# Patient Record
Sex: MALE | Race: WHITE | NOT HISPANIC OR LATINO | Employment: FULL TIME | URBAN - METROPOLITAN AREA
[De-identification: names, ages, dates, MRNs, and addresses within clinical notes are randomized per-mention and may not be internally consistent; named-entity substitution may affect disease eponyms.]

---
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
## 2022-03-31 ENCOUNTER — HOSPITAL ENCOUNTER (EMERGENCY)
Facility: HOSPITAL | Age: 22
Discharge: HOME | End: 2022-04-01
Attending: EMERGENCY MEDICINE

## 2022-03-31 DIAGNOSIS — I10 HYPERTENSION, UNSPECIFIED TYPE: Primary | ICD-10-CM

## 2022-03-31 LAB
ANION GAP SERPL CALC-SCNC: 11 MMOL/L (ref 8–16)
BUN SERPL-MCNC: 17 MG/DL (ref 6–20)
CALCIUM SERPL-MCNC: 9.5 MG/DL (ref 8.7–10.5)
CHLORIDE SERPL-SCNC: 102 MMOL/L (ref 95–110)
CO2 SERPL-SCNC: 26 MMOL/L (ref 23–29)
CREAT SERPL-MCNC: 1.1 MG/DL (ref 0.5–1.4)
EST. GFR  (AFRICAN AMERICAN): >60 ML/MIN/1.73 M^2
EST. GFR  (NON AFRICAN AMERICAN): >60 ML/MIN/1.73 M^2
GLUCOSE SERPL-MCNC: 90 MG/DL (ref 70–110)
POTASSIUM SERPL-SCNC: 3.9 MMOL/L (ref 3.5–5.1)
SODIUM SERPL-SCNC: 139 MMOL/L (ref 136–145)

## 2022-03-31 PROCEDURE — 99282 EMERGENCY DEPT VISIT SF MDM: CPT

## 2022-03-31 PROCEDURE — 36415 COLL VENOUS BLD VENIPUNCTURE: CPT | Performed by: PHYSICIAN ASSISTANT

## 2022-03-31 PROCEDURE — 80048 BASIC METABOLIC PNL TOTAL CA: CPT | Performed by: PHYSICIAN ASSISTANT

## 2022-03-31 NOTE — Clinical Note
"Seth Blair (Hunter) was seen and treated in our emergency department on 3/31/2022.  He may return to work on 04/04/2022.       If you have any questions or concerns, please don't hesitate to call.       RN    "

## 2022-04-01 VITALS
HEART RATE: 71 BPM | HEIGHT: 71 IN | WEIGHT: 253.5 LBS | BODY MASS INDEX: 35.49 KG/M2 | SYSTOLIC BLOOD PRESSURE: 126 MMHG | OXYGEN SATURATION: 97 % | TEMPERATURE: 98 F | DIASTOLIC BLOOD PRESSURE: 66 MMHG | RESPIRATION RATE: 18 BRPM

## 2022-04-01 NOTE — DISCHARGE INSTRUCTIONS
[facility] [phone]    - Dr. Ramirez  - Dr. Choudhury  - Dr. Mcknight  - Dr. Hernandez    [facility]    - Dr. Chung [phone]  - Dr. Gonzalez [phone]  - Dr. Benitez [phone]    Dr. Martinez [phone]  Dr. Yates [phone]  Dr. Valencia [phone]  Dr. Chung [phone]  Dr. Garcia [phone]  Dr. Gutierrez [phone]  Dr. Alcantar [phone]  Dr. Simmons [phone]  Dr. Condon [phone]    [facility]    [facility] [phone]  Dr. Palumbo [phone]  Dr. Peace [phone]  Dr. Reyes [phone]  Dr. Watson [phone]  Dr. Faustin [phone]  Dr. De La Rosa [phone]          [facility]  - [address] [place] [address]  - [phone]    [facility]  - [address] [place] [address]  - [phone]    [facility]  - [address], [address], [place]  - [phone]    [facility]  - [address] [place] [address]  - [phone]    [facility]  - [address], [place] [address]  - [phone]    [facility]  - [address] [place] [address]  - [phone]    [facility]  - [address] [place], [place] [id number] - [phone]     pain control  Ortho follow up  MRI showed tendinitis  May benefit from po steroids  PT/Rehab

## 2022-04-01 NOTE — ED PROVIDER NOTES
Encounter Date: 3/31/2022       History     Chief Complaint   Patient presents with    Hypertension     Pt c/o high blood pressure that he was dx with since Tuesday , pt is having a hard time finding a PCP , pt came in with mother today after pt reported not feeling great after checking his BP at a friends house ; resp e/u ; GCS 15 ; NADN ; pt has 1 kidney     21 yo pmhx of solitary kidney male pw hypertension. Denies CP, slurred speech, facial asymmetry, vision changes, unilateral weakness or numbness. No difficulty walking. Pt has appointment scheduled with PCP next month but was not sure if he could wait that long. Denies fevers, chills, cough, SOB, N/V/D, abdominal pain, dysuria, hematuria or any other complaints.      The history is provided by the patient.     Review of patient's allergies indicates:  No Known Allergies  No past medical history on file.  No past surgical history on file.  No family history on file.     Review of Systems   Constitutional: Negative for activity change, diaphoresis and fever.   HENT: Negative for drooling, rhinorrhea, sore throat and trouble swallowing.    Eyes: Negative for pain and visual disturbance.   Respiratory: Negative for cough, shortness of breath and stridor.    Cardiovascular: Negative for chest pain and leg swelling.   Gastrointestinal: Negative for abdominal distention, abdominal pain, constipation and vomiting.   Genitourinary: Negative for dysuria, hematuria and penile discharge.   Musculoskeletal: Negative for gait problem.   Skin: Negative for rash.   Neurological: Negative for seizures, facial asymmetry and headaches.   Psychiatric/Behavioral: Negative for hallucinations and suicidal ideas.       Physical Exam     Initial Vitals [03/31/22 2019]   BP Pulse Resp Temp SpO2   (!) 157/104 92 20 98.3 °F (36.8 °C) 98 %      MAP       --         Physical Exam    Nursing note and vitals reviewed.  Constitutional: He appears well-developed. No distress.   HENT:   Head:  Normocephalic and atraumatic.   Nose: Nose normal.   Eyes: EOM are normal.   Neck: Neck supple. No tracheal deviation present. No JVD present.   Cardiovascular: Normal rate, regular rhythm, normal heart sounds and intact distal pulses. Exam reveals no gallop and no friction rub.    No murmur heard.  Pulmonary/Chest: Breath sounds normal. No respiratory distress. He has no wheezes. He has no rhonchi. He has no rales.   Abdominal: Abdomen is soft. Bowel sounds are normal. He exhibits no distension. There is no abdominal tenderness. There is no rebound and no guarding.   Musculoskeletal:         General: Normal range of motion.      Cervical back: Neck supple.     Neurological: He is alert and oriented to person, place, and time. He has normal strength. No cranial nerve deficit or sensory deficit.   Cranial nerves II through XII grossly intact. Finger to nose normal. Tone normal. Sens intact to light touch. No drift. No disdiadochokinesia. Strength 5/5 bilaterally upper and lower. Normal gait. No Romberg. Speech and cognition is normal.  No focal neurologic deficit.     Skin: Skin is warm and dry. Capillary refill takes less than 2 seconds. No rash noted.   Psychiatric: He has a normal mood and affect.         ED Course   Procedures  Labs Reviewed   BASIC METABOLIC PANEL          Imaging Results    None          Medications - No data to display  Medical Decision Making:   ED Management:  21 yo M presents to the emergency department complaining of high blood pressure.  Patient is otherwise asymptomatic without confusion, chest pain, hematuria, or SOB.  BP today is 157/104  Doubt CV, AMI, heart failure, renal infarction or failure or other end organ damage.    Disposition:Discussed with patient their elevated blood pressure and need for close outpatient management of their hypertension. Pt understands and agrees with discharge instructions. Pt also given strict return precautions for any new or worsening symptoms and  plans to follow up closely with PCP.                         Clinical Impression:   Final diagnoses:  [I10] Hypertension, unspecified type (Primary)          ED Disposition Condition    Discharge Stable        ED Prescriptions     None        Follow-up Information     Follow up With Specialties Details Why Contact [facility]  Go in 1 day  [address] [facility] [address]  [phone]      [facility] Emergency Dept Emergency Medicine Go to  As needed, If symptoms worsen [address] [place] [address] Drive  [facility] [address] [phone]    Ella Hernandez MD Family Medicine Go in 1 day  [address] [facility] [address]  [phone]             Rito Stokes MD  04/01/22 5354

## 2022-04-01 NOTE — FIRST PROVIDER EVALUATION
Emergency Department TeleTriage Encounter Note      CHIEF COMPLAINT    Chief Complaint   Patient presents with    Hypertension     Pt c/o high blood pressure that he was dx with since Tuesday , pt is having a hard time finding a PCP , pt came in with mother today after pt reported not feeling great after checking his BP at a friends house ; resp e/u ; GCS 15 ; NADN ; pt has 1 kidney       VITAL SIGNS   Initial Vitals [03/31/22 2019]   BP Pulse Resp Temp SpO2   (!) 157/104 92 20 98.3 °F (36.8 °C) 98 %      MAP       --            ALLERGIES    Review of patient's allergies indicates:  No Known Allergies    PROVIDER TRIAGE NOTE  This is a teletriage evaluation of a 22 y.o. male presenting to the ED with c/o feeling dizzy/overheated while working outside today, check his BP as it has been running high recently when checked, 140/90 at home. Concern for BP reading given single kidney. Reports intermittent dizziness when he turns his head or stands up quickly. Not on medication for HTN.     PE: no truncal ataxia, no dysarthria. Non-toxic/well-appearing. No respiratory distress, speaks in full sentences without issue. No active emesis nor cough. Normal eye contact and mentation.     Plan: BMP for Cr eval. Do not suspect central dizziness in etiology at this time. Further/augmented workup at discretion of examining provider.     All ED beds are full at present; patient notified of this status.  Patient seen and medically screened by BERNICE via teletriage. Orders initiated at triage to expedite care.  Patient is stable and will be placed in an ED bed when available.  Care will be transferred to an alternate provider when patient has been placed in an Exam Room further exam, additional orders, and disposition.         ORDERS  Labs Reviewed   BASIC METABOLIC PANEL       ED Orders (720h ago, onward)    Start Ordered     Status Ordering Provider    03/31/22 2122 03/31/22 2122  Basic metabolic panel  STAT         Pending Collection  LEONIDES BRANTLEY            Virtual Visit Note: The provider triage portion of this emergency department evaluation and documentation was performed via [employer], a HIPAA-compliant telemedicine application, in concert with a tele-presenter in the room. A face to face patient evaluation with one of my colleagues will occur once the patient is placed in an emergency department room.      DISCLAIMER: This note was prepared with [employer] voice recognition transcription software. Garbled syntax, mangled pronouns, and other bizarre constructions may be attributed to that software system.

## 2023-06-02 PROCEDURE — 10120 INC&RMVL FB SUBQ TISS SMPL: CPT

## 2023-06-02 PROCEDURE — 99284 EMERGENCY DEPT VISIT MOD MDM: CPT | Mod: 25

## 2023-06-03 ENCOUNTER — HOSPITAL ENCOUNTER (EMERGENCY)
Facility: HOSPITAL | Age: 23
Discharge: HOME | End: 2023-06-03
Attending: FAMILY MEDICINE

## 2023-06-03 VITALS
HEART RATE: 89 BPM | HEIGHT: 71 IN | BODY MASS INDEX: 35.28 KG/M2 | WEIGHT: 252 LBS | TEMPERATURE: 99 F | OXYGEN SATURATION: 100 % | DIASTOLIC BLOOD PRESSURE: 81 MMHG | RESPIRATION RATE: 18 BRPM | SYSTOLIC BLOOD PRESSURE: 144 MMHG

## 2023-06-03 DIAGNOSIS — M79.5 FOREIGN BODY (FB) IN SOFT TISSUE: ICD-10-CM

## 2023-06-03 PROCEDURE — 25000003 PHARM REV CODE 250

## 2023-06-03 PROCEDURE — 96375 TX/PRO/DX INJ NEW DRUG ADDON: CPT

## 2023-06-03 PROCEDURE — 90715 TDAP VACCINE 7 YRS/> IM: CPT | Performed by: FAMILY MEDICINE

## 2023-06-03 PROCEDURE — 25000003 PHARM REV CODE 250: Performed by: FAMILY MEDICINE

## 2023-06-03 PROCEDURE — 63600175 PHARM REV CODE 636 W HCPCS: Performed by: FAMILY MEDICINE

## 2023-06-03 PROCEDURE — 96365 THER/PROPH/DIAG IV INF INIT: CPT

## 2023-06-03 PROCEDURE — 90471 IMMUNIZATION ADMIN: CPT | Performed by: FAMILY MEDICINE

## 2023-06-03 RX ORDER — CLINDAMYCIN HYDROCHLORIDE 150 MG/1
450 CAPSULE ORAL EVERY 8 HOURS
Qty: 90 CAPSULE | Refills: 0 | Status: SHIPPED | OUTPATIENT
Start: 2023-06-03 | End: 2023-06-13

## 2023-06-03 RX ORDER — LEVOFLOXACIN 250 MG/1
750 TABLET ORAL
Status: COMPLETED | OUTPATIENT
Start: 2023-06-03 | End: 2023-06-03

## 2023-06-03 RX ORDER — LEVOFLOXACIN 750 MG/1
750 TABLET ORAL DAILY
Qty: 10 TABLET | Refills: 0 | Status: SHIPPED | OUTPATIENT
Start: 2023-06-03 | End: 2023-06-13

## 2023-06-03 RX ORDER — HYDROCODONE BITARTRATE AND ACETAMINOPHEN 7.5; 325 MG/1; MG/1
1 TABLET ORAL EVERY 6 HOURS PRN
Qty: 16 TABLET | Refills: 0 | Status: SHIPPED | OUTPATIENT
Start: 2023-06-03

## 2023-06-03 RX ORDER — LIDOCAINE HYDROCHLORIDE 20 MG/ML
10 INJECTION, SOLUTION EPIDURAL; INFILTRATION; INTRACAUDAL; PERINEURAL
Status: COMPLETED | OUTPATIENT
Start: 2023-06-03 | End: 2023-06-03

## 2023-06-03 RX ORDER — HYDROMORPHONE HYDROCHLORIDE 1 MG/ML
1 INJECTION, SOLUTION INTRAMUSCULAR; INTRAVENOUS; SUBCUTANEOUS
Status: COMPLETED | OUTPATIENT
Start: 2023-06-03 | End: 2023-06-03

## 2023-06-03 RX ORDER — HYDROCODONE BITARTRATE AND ACETAMINOPHEN 10; 325 MG/1; MG/1
1 TABLET ORAL
Status: COMPLETED | OUTPATIENT
Start: 2023-06-03 | End: 2023-06-03

## 2023-06-03 RX ORDER — DOXYCYCLINE 100 MG/1
100 CAPSULE ORAL 2 TIMES DAILY
Qty: 20 CAPSULE | Refills: 0 | Status: SHIPPED | OUTPATIENT
Start: 2023-06-03 | End: 2023-06-13

## 2023-06-03 RX ORDER — CLINDAMYCIN PHOSPHATE 150 MG/ML
INJECTION, SOLUTION INTRAVENOUS
Status: COMPLETED
Start: 2023-06-03 | End: 2023-06-03

## 2023-06-03 RX ORDER — LIDOCAINE HYDROCHLORIDE AND EPINEPHRINE 20; 10 MG/ML; UG/ML
10 INJECTION, SOLUTION INFILTRATION; PERINEURAL ONCE
Status: DISCONTINUED | OUTPATIENT
Start: 2023-06-03 | End: 2023-06-03

## 2023-06-03 RX ORDER — CLINDAMYCIN PHOSPHATE 600 MG/50ML
600 INJECTION, SOLUTION INTRAVENOUS
Status: DISCONTINUED | OUTPATIENT
Start: 2023-06-03 | End: 2023-06-03

## 2023-06-03 RX ADMIN — LEVOFLOXACIN 750 MG: 250 TABLET, FILM COATED ORAL at 02:06

## 2023-06-03 RX ADMIN — HYDROMORPHONE HYDROCHLORIDE 1 MG: 1 INJECTION, SOLUTION INTRAMUSCULAR; INTRAVENOUS; SUBCUTANEOUS at 02:06

## 2023-06-03 RX ADMIN — HYDROCODONE BITARTRATE AND ACETAMINOPHEN 1 TABLET: 10; 325 TABLET ORAL at 06:06

## 2023-06-03 RX ADMIN — LIDOCAINE HYDROCHLORIDE 200 MG: 20 INJECTION, SOLUTION EPIDURAL; INFILTRATION; INTRACAUDAL; PERINEURAL at 03:06

## 2023-06-03 RX ADMIN — TETANUS TOXOID, REDUCED DIPHTHERIA TOXOID AND ACELLULAR PERTUSSIS VACCINE, ADSORBED 0.5 ML: 5; 2.5; 8; 8; 2.5 SUSPENSION INTRAMUSCULAR at 02:06

## 2023-06-03 RX ADMIN — CLINDAMYCIN PHOSPHATE 600 MG: 150 INJECTION, SOLUTION INTRAVENOUS at 04:06

## 2023-06-03 RX ADMIN — DOXYCYCLINE 100 MG: 100 INJECTION, POWDER, LYOPHILIZED, FOR SOLUTION INTRAVENOUS at 02:06

## 2023-06-03 NOTE — ED PROVIDER NOTES
Encounter Date: 6/2/2023       History     Chief Complaint   Patient presents with    Foreign Body in Skin     Catfish franco to L forearm       Patient comes our facility after having a catfish franco stuck into his left forearm.  Patient states this happened around 11:30 p.m. on 06/02/2023.  Patient attempted to remove the acosta was unable to do so.  Patient has some mild swelling and pain at site.    Review of patient's allergies indicates:  No Known Allergies  Past Medical History:   Diagnosis Date    Hypertension      No past surgical history on file.  No family history on file.  Social History     Tobacco Use    Smoking status: Every Day     Types: Cigarettes, Vaping with nicotine   Substance Use Topics    Alcohol use: Yes    Drug use: Never     Review of Systems   Skin:  Positive for wound.   All other systems reviewed and are negative.    Physical Exam     Initial Vitals   BP Pulse Resp Temp SpO2   06/02/23 2319 06/02/23 2318 06/02/23 2318 06/02/23 2318 06/02/23 2318   (!) 144/81 89 18 98.5 °F (36.9 °C) 100 %      MAP       --                Physical Exam    Nursing note and vitals reviewed.  Constitutional: He appears well-developed and well-nourished. He is not diaphoretic. No distress.   HENT:   Head: Normocephalic and atraumatic.   Nose: Nose normal.   Eyes: Conjunctivae and EOM are normal. Right eye exhibits no discharge. Left eye exhibits no discharge. No scleral icterus.   Neck: Neck supple.   Normal range of motion.  Cardiovascular:  Normal rate, regular rhythm, normal heart sounds and intact distal pulses.           No murmur heard.  Pulmonary/Chest: Breath sounds normal. No respiratory distress. He has no wheezes.   Abdominal: Abdomen is soft. Bowel sounds are normal. He exhibits no distension. There is no abdominal tenderness.   Musculoskeletal:         General: Tenderness present. No edema. Normal range of motion.      Cervical back: Normal range of motion and neck supple.      Comments: Tenderness  "to left forearm surrounding calf is franco site     Lymphadenopathy:     He has no cervical adenopathy.   Neurological: He is alert and oriented to person, place, and time. He has normal strength. No sensory deficit. GCS score is 15. GCS eye subscore is 4. GCS verbal subscore is 5. GCS motor subscore is 6.   Skin: Skin is warm. Capillary refill takes less than 2 seconds. No rash and no abscess noted. No pallor.   Mild tissue swelling around calf patient franco puncture wound   Psychiatric: He has a normal mood and affect. His behavior is normal. Judgment and thought content normal.       ED Course   Foreign Body    Date/Time: 6/3/2023 4:27 AM  Performed by: Lazaro Fink MD  Authorized by: Lazaro Fink MD   Consent Done: Yes  Consent: Verbal consent obtained.  Risks and benefits: risks, benefits and alternatives were discussed  Consent given by: parent  Patient understanding: patient states understanding of the procedure being performed  Patient consent: the patient's understanding of the procedure matches consent given  Procedure consent: procedure consent matches procedure scheduled  Relevant documents: relevant documents present and verified  Test results: test results available and properly labeled  Site marked: the operative site was marked  Imaging studies: imaging studies available  Required items: required blood products, implants, devices, and special equipment available  Patient identity confirmed: MRN, , name and verbally with patient  Time out: Immediately prior to procedure a "time out" was called to verify the correct patient, procedure, equipment, support staff and site/side marked as required.  Body area: skin  General location: upper extremity  Location details: left forearm  Anesthesia: local infiltration    Anesthesia:  Local Anesthetic: lidocaine 2% with epinephrine  Anesthetic total: 8.5 mL    Patient sedated: no  Patient restrained: no  Patient cooperative: yes  Localization method: " visualized (X-ray used as well)  Removal mechanism: forceps and scalpel  Dressing: dressing applied  Tendon involvement: none  Depth: subcutaneous  Complexity: simple  1 objects recovered.  Objects recovered: Catfish sofia in 1 piece  Post-procedure assessment: foreign body removed  Patient tolerance: Patient tolerated the procedure well with no immediate complications    Labs Reviewed - No data to display       Imaging Results              X-Ray Forearm Left (In process)                   X-Rays:   Independently Interpreted Readings:   Other Readings:  X-ray of left forearm shows foreign body, catfish sofia with about 1.7 cm penetration in the skin tissue. sofia is in 1 piece.  Medications   LIDOcaine 2%/EPINEPHrine 1:100,000 injection 10 mL (has no administration in time range)   clindamycin in D5W 600 mg/50 mL IVPB 600 mg (has no administration in time range)   Tdap (BOOSTRIX) vaccine injection 0.5 mL (0.5 mLs Intramuscular Given 6/3/23 0250)   doxycycline (VIBRAMYCIN) 100 mg in dextrose 5 % (D5W) 250 mL IVPB (Vial-Mate) (100 mg Intravenous New Bag 6/3/23 0255)   levoFLOXacin tablet 750 mg (750 mg Oral Given 6/3/23 0249)   HYDROmorphone injection 1 mg (1 mg Intravenous Given 6/3/23 0252)     Medical Decision Making:   Initial Assessment:   Catfish sofia, concern infectious source  ED Management:  Patient arrived with catfish sofia to left forearm.  Calf is was in salt water.  Patient received antibiotics to include clindamycin, Levaquin, doxycycline in the emergency department.  Calf is volar was removed.  X-ray was used to identify sofia.  Sofia was removed in 1 piece.  Patient did require slight opening of penetrating wound to remove bar.  Wound was left open without sutures.  Wound was dressed.  Patient was prescribed oral antibiotics to include clindamycin, doxycycline, Levaquin.  Patient to follow up with PCP in the next 2 weeks.  Patient was given thorough instructions as to when to return to ER when signs of  infection occurred.  Patient and mother voiced understanding.                        Clinical Impression:   Final diagnoses:  [M79.5] Foreign body (FB) in soft tissue - Catfish franco        ED Disposition Condition    Discharge Stable          ED Prescriptions       Medication Sig Dispense Start Date End Date Auth. Provider    clindamycin (CLEOCIN) 150 MG capsule Take 3 capsules (450 mg total) by mouth every 8 (eight) hours. for 10 days 90 capsule 6/3/2023 6/13/2023 Lazaro Fink MD    levoFLOXacin (LEVAQUIN) 750 MG tablet Take 1 tablet (750 mg total) by mouth once daily. for 10 days 10 tablet 6/3/2023 6/13/2023 Lazaro Fink MD    doxycycline (VIBRAMYCIN) 100 MG Cap Take 1 capsule (100 mg total) by mouth 2 (two) times daily. for 10 days 20 capsule 6/3/2023 6/13/2023 Lazaro Fink MD    HYDROcodone-acetaminophen (NORCO) 7.5-325 mg per tablet Take 1 tablet by mouth every 6 (six) hours as needed for Pain. 16 tablet 6/3/2023 -- Lazaro Fink MD          Follow-up Information    None     Follow-up with primary care physician in 2 weeks.  Return ER signs of infection occur.     Lazaro Fink MD  06/03/23 043

## 2023-06-03 NOTE — DISCHARGE INSTRUCTIONS
Follow-up with primary care physician the next 2 weeks.  Return to ER signs of infection as discussed occur.  Take antibiotics as prescribed.

## 2023-08-06 ENCOUNTER — HOSPITAL ENCOUNTER (OUTPATIENT)
Facility: HOSPITAL | Age: 23
Discharge: HOME | End: 2023-08-07
Attending: EMERGENCY MEDICINE | Admitting: INTERNAL MEDICINE

## 2023-08-06 DIAGNOSIS — R20.2 PARESTHESIAS: ICD-10-CM

## 2023-08-06 DIAGNOSIS — G45.9 TIA (TRANSIENT ISCHEMIC ATTACK): ICD-10-CM

## 2023-08-06 DIAGNOSIS — I63.9 STROKE: Primary | ICD-10-CM

## 2023-08-06 DIAGNOSIS — R20.0 LEFT SIDED NUMBNESS: ICD-10-CM

## 2023-08-06 PROBLEM — E66.9 OBESITY: Status: ACTIVE | Noted: 2023-08-06

## 2023-08-06 PROBLEM — I10 HTN (HYPERTENSION): Status: ACTIVE | Noted: 2023-08-06

## 2023-08-06 PROBLEM — E78.5 HLD (HYPERLIPIDEMIA): Status: ACTIVE | Noted: 2023-08-06

## 2023-08-06 LAB
ALBUMIN SERPL BCP-MCNC: 4.3 G/DL (ref 3.5–5.2)
ALP SERPL-CCNC: 89 U/L (ref 55–135)
ALT SERPL W/O P-5'-P-CCNC: 29 U/L (ref 10–44)
AMPHET+METHAMPHET UR QL: NEGATIVE
AMPHET+METHAMPHET UR QL: NEGATIVE
ANION GAP SERPL CALC-SCNC: 5 MMOL/L (ref 8–16)
AST SERPL-CCNC: 27 U/L (ref 10–40)
BARBITURATES UR QL SCN>200 NG/ML: NEGATIVE
BARBITURATES UR QL SCN>200 NG/ML: NEGATIVE
BASOPHILS # BLD AUTO: 0.05 K/UL (ref 0–0.2)
BASOPHILS NFR BLD: 0.4 % (ref 0–1.9)
BENZODIAZ UR QL SCN>200 NG/ML: NEGATIVE
BENZODIAZ UR QL SCN>200 NG/ML: NEGATIVE
BILIRUB SERPL-MCNC: 0.6 MG/DL (ref 0.1–1)
BILIRUB UR QL STRIP: NEGATIVE
BUN SERPL-MCNC: 12 MG/DL (ref 6–20)
BZE UR QL SCN: NEGATIVE
BZE UR QL SCN: NEGATIVE
CALCIUM SERPL-MCNC: 9 MG/DL (ref 8.7–10.5)
CANNABINOIDS UR QL SCN: NEGATIVE
CANNABINOIDS UR QL SCN: NEGATIVE
CHLORIDE SERPL-SCNC: 108 MMOL/L (ref 95–110)
CHOLEST SERPL-MCNC: 210 MG/DL (ref 120–199)
CHOLEST/HDLC SERPL: 6.8 {RATIO} (ref 2–5)
CLARITY UR: CLEAR
CO2 SERPL-SCNC: 26 MMOL/L (ref 23–29)
COLOR UR: YELLOW
CREAT SERPL-MCNC: 1.3 MG/DL (ref 0.5–1.4)
CREAT SERPL-MCNC: 1.4 MG/DL (ref 0.5–1.4)
CREAT UR-MCNC: 68 MG/DL (ref 23–375)
CREAT UR-MCNC: 78 MG/DL (ref 23–375)
DIFFERENTIAL METHOD: ABNORMAL
EOSINOPHIL # BLD AUTO: 0.4 K/UL (ref 0–0.5)
EOSINOPHIL NFR BLD: 3.1 % (ref 0–8)
ERYTHROCYTE [DISTWIDTH] IN BLOOD BY AUTOMATED COUNT: 13.1 % (ref 11.5–14.5)
EST. GFR  (NO RACE VARIABLE): >60 ML/MIN/1.73 M^2
ETHANOL SERPL-MCNC: <5 MG/DL
GLUCOSE SERPL-MCNC: 87 MG/DL (ref 70–110)
GLUCOSE SERPL-MCNC: 93 MG/DL (ref 70–110)
GLUCOSE UR QL STRIP: NEGATIVE
HCT VFR BLD AUTO: 42.9 % (ref 40–54)
HDLC SERPL-MCNC: 31 MG/DL (ref 40–75)
HDLC SERPL: 14.8 % (ref 20–50)
HGB BLD-MCNC: 14.8 G/DL (ref 14–18)
HGB UR QL STRIP: NEGATIVE
IMM GRANULOCYTES # BLD AUTO: 0.04 K/UL (ref 0–0.04)
IMM GRANULOCYTES NFR BLD AUTO: 0.4 % (ref 0–0.5)
INR PPP: 0.9 (ref 0.8–1.2)
KETONES UR QL STRIP: NEGATIVE
LDLC SERPL CALC-MCNC: 111.4 MG/DL (ref 63–159)
LEUKOCYTE ESTERASE UR QL STRIP: NEGATIVE
LYMPHOCYTES # BLD AUTO: 2 K/UL (ref 1–4.8)
LYMPHOCYTES NFR BLD: 18.1 % (ref 18–48)
MAGNESIUM SERPL-MCNC: 1.9 MG/DL (ref 1.6–2.6)
MCH RBC QN AUTO: 31.3 PG (ref 27–31)
MCHC RBC AUTO-ENTMCNC: 34.5 G/DL (ref 32–36)
MCV RBC AUTO: 91 FL (ref 82–98)
MONOCYTES # BLD AUTO: 1.1 K/UL (ref 0.3–1)
MONOCYTES NFR BLD: 10.2 % (ref 4–15)
NEUTROPHILS # BLD AUTO: 7.6 K/UL (ref 1.8–7.7)
NEUTROPHILS NFR BLD: 67.8 % (ref 38–73)
NITRITE UR QL STRIP: NEGATIVE
NONHDLC SERPL-MCNC: 179 MG/DL
NRBC BLD-RTO: 0 /100 WBC
OPIATES UR QL SCN: NEGATIVE
OPIATES UR QL SCN: NEGATIVE
PCP UR QL SCN>25 NG/ML: NEGATIVE
PCP UR QL SCN>25 NG/ML: NEGATIVE
PH UR STRIP: 6 [PH] (ref 5–8)
PLATELET # BLD AUTO: 253 K/UL (ref 150–450)
PMV BLD AUTO: 10.2 FL (ref 9.2–12.9)
POTASSIUM SERPL-SCNC: 3.8 MMOL/L (ref 3.5–5.1)
PROT SERPL-MCNC: 7.7 G/DL (ref 6–8.4)
PROT UR QL STRIP: ABNORMAL
PROTHROMBIN TIME: 10.4 SEC (ref 9–12.5)
RBC # BLD AUTO: 4.73 M/UL (ref 4.6–6.2)
SAMPLE: NORMAL
SODIUM SERPL-SCNC: 139 MMOL/L (ref 136–145)
SP GR UR STRIP: >1.03 (ref 1–1.03)
TOXICOLOGY INFORMATION: NORMAL
TOXICOLOGY INFORMATION: NORMAL
TRIGL SERPL-MCNC: 338 MG/DL (ref 30–150)
TSH SERPL DL<=0.005 MIU/L-ACNC: 0.86 UIU/ML (ref 0.34–5.6)
URN SPEC COLLECT METH UR: ABNORMAL
UROBILINOGEN UR STRIP-ACNC: NEGATIVE EU/DL
WBC # BLD AUTO: 11.19 K/UL (ref 3.9–12.7)

## 2023-08-06 PROCEDURE — 93010 EKG 12-LEAD: ICD-10-PCS | Mod: ,,, | Performed by: INTERNAL MEDICINE

## 2023-08-06 PROCEDURE — G0378 HOSPITAL OBSERVATION PER HR: HCPCS

## 2023-08-06 PROCEDURE — 83735 ASSAY OF MAGNESIUM: CPT | Performed by: EMERGENCY MEDICINE

## 2023-08-06 PROCEDURE — 99900035 HC TECH TIME PER 15 MIN (STAT)

## 2023-08-06 PROCEDURE — 80307 DRUG TEST PRSMV CHEM ANLYZR: CPT | Performed by: EMERGENCY MEDICINE

## 2023-08-06 PROCEDURE — 85610 PROTHROMBIN TIME: CPT | Performed by: EMERGENCY MEDICINE

## 2023-08-06 PROCEDURE — 93005 ELECTROCARDIOGRAM TRACING: CPT | Performed by: INTERNAL MEDICINE

## 2023-08-06 PROCEDURE — 36415 COLL VENOUS BLD VENIPUNCTURE: CPT | Performed by: EMERGENCY MEDICINE

## 2023-08-06 PROCEDURE — 82962 GLUCOSE BLOOD TEST: CPT

## 2023-08-06 PROCEDURE — 84443 ASSAY THYROID STIM HORMONE: CPT | Performed by: EMERGENCY MEDICINE

## 2023-08-06 PROCEDURE — 93010 ELECTROCARDIOGRAM REPORT: CPT | Mod: ,,, | Performed by: INTERNAL MEDICINE

## 2023-08-06 PROCEDURE — 25500020 PHARM REV CODE 255: Performed by: EMERGENCY MEDICINE

## 2023-08-06 PROCEDURE — 80307 DRUG TEST PRSMV CHEM ANLYZR: CPT | Performed by: INTERNAL MEDICINE

## 2023-08-06 PROCEDURE — 94760 N-INVAS EAR/PLS OXIMETRY 1: CPT

## 2023-08-06 PROCEDURE — 80061 LIPID PANEL: CPT | Performed by: EMERGENCY MEDICINE

## 2023-08-06 PROCEDURE — 82565 ASSAY OF CREATININE: CPT

## 2023-08-06 PROCEDURE — G0425 INPT/ED TELECONSULT30: HCPCS | Mod: GT,,, | Performed by: PSYCHIATRY & NEUROLOGY

## 2023-08-06 PROCEDURE — 99291 CRITICAL CARE FIRST HOUR: CPT

## 2023-08-06 PROCEDURE — 85025 COMPLETE CBC W/AUTO DIFF WBC: CPT | Performed by: EMERGENCY MEDICINE

## 2023-08-06 PROCEDURE — 82077 ASSAY SPEC XCP UR&BREATH IA: CPT | Performed by: EMERGENCY MEDICINE

## 2023-08-06 PROCEDURE — 81003 URINALYSIS AUTO W/O SCOPE: CPT | Mod: 59 | Performed by: INTERNAL MEDICINE

## 2023-08-06 PROCEDURE — 80053 COMPREHEN METABOLIC PANEL: CPT | Performed by: EMERGENCY MEDICINE

## 2023-08-06 PROCEDURE — 25000003 PHARM REV CODE 250: Performed by: EMERGENCY MEDICINE

## 2023-08-06 PROCEDURE — G0425 PR INPT TELEHEALTH CONSULT 30M: ICD-10-PCS | Mod: GT,,, | Performed by: PSYCHIATRY & NEUROLOGY

## 2023-08-06 RX ORDER — ATORVASTATIN CALCIUM 40 MG/1
40 TABLET, FILM COATED ORAL DAILY
Status: DISCONTINUED | OUTPATIENT
Start: 2023-08-07 | End: 2023-08-07 | Stop reason: HOSPADM

## 2023-08-06 RX ORDER — SODIUM CHLORIDE 0.9 % (FLUSH) 0.9 %
10 SYRINGE (ML) INJECTION
Status: DISCONTINUED | OUTPATIENT
Start: 2023-08-06 | End: 2023-08-07 | Stop reason: HOSPADM

## 2023-08-06 RX ORDER — ASPIRIN 81 MG/1
81 TABLET ORAL DAILY
Status: DISCONTINUED | OUTPATIENT
Start: 2023-08-07 | End: 2023-08-07 | Stop reason: HOSPADM

## 2023-08-06 RX ORDER — LISINOPRIL AND HYDROCHLOROTHIAZIDE 10; 12.5 MG/1; MG/1
1 TABLET ORAL DAILY
Status: ON HOLD | COMMUNITY
End: 2023-08-07 | Stop reason: SDUPTHER

## 2023-08-06 RX ORDER — ASPIRIN 325 MG
325 TABLET ORAL
Status: COMPLETED | OUTPATIENT
Start: 2023-08-06 | End: 2023-08-06

## 2023-08-06 RX ADMIN — IOHEXOL 100 ML: 350 INJECTION, SOLUTION INTRAVENOUS at 02:08

## 2023-08-06 RX ADMIN — ASPIRIN 325 MG: 325 TABLET ORAL at 06:08

## 2023-08-06 RX ADMIN — SODIUM CHLORIDE 1000 ML: 0.9 INJECTION, SOLUTION INTRAVENOUS at 03:08
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
## 2023-08-06 NOTE — SUBJECTIVE & OBJECTIVE
"  Woke up with symptoms?: no    Recent bleeding noted: no  Does the patient take any Blood Thinners? no  Medications: Antiplatelets:  NO      Past Medical History: hypertension and ETOH, obesity    Past Surgical History: no major surgeries within the last 2 weeks    Family History: no relevant history    Social History: drinking    Allergies: No Known Allergies     Review of Systems   Constitutional: Negative for chills, diaphoresis and fever.   HENT: Negative for hearing loss, tinnitus and trouble swallowing.    Eyes: Negative for visual disturbance.   Respiratory: Negative for shortness of breath.    Cardiovascular: Negative for chest pain and palpitations.   Gastrointestinal: Negative for blood in stool and vomiting.   Endocrine: Negative for cold intolerance.   Genitourinary: Negative for hematuria.   Musculoskeletal: Negative for gait problem and neck pain.   Allergic/Immunologic: Negative for immunocompromised state.   Neurological: Positive for numbness. Negative for dizziness, speech difficulty, weakness and headaches.   Hematological: Does not bruise/bleed easily.   Psychiatric/Behavioral: Negative for agitation, behavioral problems and confusion.     Objective:   Vitals: Blood pressure 126/65, pulse 88, temperature 98.6 °F (37 °C), temperature source Oral, resp. rate (!) 25, height 5' 11" (1.803 m), weight 120.2 kg (265 lb), SpO2 96 %.     CT READ: Yes  No hemmorhage. No mass effect. No early infarct signs.    CTA brain and neck: no LVO, no high grade stenosis, no carotid disease.    Physical Exam  Vitals and nursing note reviewed.   Constitutional:       Appearance: He is obese. He is not diaphoretic.   HENT:      Head: Normocephalic and atraumatic.      Nose: Nose normal.   Eyes:      Extraocular Movements: Extraocular movements intact.   Cardiovascular:      Rate and Rhythm: Normal rate and regular rhythm.   Pulmonary:      Effort: No respiratory distress.   Abdominal:      General: There is no " distension.   Genitourinary:     Comments: na  Musculoskeletal:      Cervical back: Normal range of motion.      Right lower leg: No edema.      Left lower leg: No edema.   Skin:     Findings: No erythema or rash.   Neurological:      Mental Status: He is alert and oriented to person, place, and time.      Cranial Nerves: No cranial nerve deficit.      Sensory: Sensory deficit present.      Motor: No weakness.      Coordination: Coordination normal.   Psychiatric:         Mood and Affect: Mood normal.         Behavior: Behavior normal.

## 2023-08-06 NOTE — SUBJECTIVE & OBJECTIVE
"  Woke up with symptoms?: no    Recent bleeding noted: no  Does the patient take any Blood Thinners? no  Medications: Antiplatelets:  NO      Past Medical History: {TELESTROKE MEDICAL HX:64590}    Past Surgical History: {TELESTROKE SURGICAL HX:20925}    Family History: {TELESTROKE MEDICAL HX:39622}    [facility]    Allergies: No Known Allergies {[facility] ALLERGIES:71266}    Review of Systems  Objective:   Vitals: Blood pressure 126/65, pulse 88, temperature 98.6 °F (37 °C), temperature source Oral, resp. rate (!) 25, height 5' 11" (1.803 m), weight 120.2 kg (265 lb), SpO2 96 %. {TELEROKE VITALS:71319}    CT READ: {TELESTROKE CT READ:52505}    Physical Exam      "

## 2023-08-06 NOTE — PHARMACY MED REC
"              .  Admission Medication History     The home medication history was taken by [place] Napoles.    You may go to "Admission" then "Reconcile Home Medications" tabs to review and/or act upon these items.     The home medication list has been updated by the Pharmacy department.   Please read ALL comments highlighted in yellow.   Please address this information as you see fit.    Feel free to contact us if you have any questions or require assistance.              Medications listed below were obtained from: Patient/family and Analytic software- [email or web address].  No current facility-administered medications on file prior to encounter.     Current Outpatient Medications on File Prior to Encounter   Medication Sig Dispense Refill    lisinopriL-hydrochlorothiazide (PRINZIDE,ZESTORETIC) 10-12.5 mg per tablet Take 1 tablet by mouth once daily.      sulfamethoxazole-trimethoprim 800-160mg (BACTRIM DS) 800-160 mg Tab Take 1 tablet by mouth 2 (two) times daily.         Potential issues to be addressed PRIOR TO DISCHARGE  Drug cost interfering with therapy: (Lisinopril HTCZ 10-12.5mg)    [place] Napoles  [id number]            "

## 2023-08-06 NOTE — CONSULTS
[facility]  Vascular Neurology  Comprehensive Stroke Center  TeleVascular Neurology Acute Consultation Note      Consults    Consulting Provider: SREEDHAR ESPINOSA  Current Providers  No providers found    Patient Location:  [facility] EMERGENCY DEPARTMENT Emergency Department  Spoke hospital nurse at bedside with patient assisting consultant.     Patient information was obtained from patient, past medical records, and primary team.         Assessment/Plan:    24 y/o with HTN, obesity, ETOH use, panic attacks, presents with acute onset L face and arm numbness.   NIHSS 1 for sensory only.  NCCT head without acute abnormality.  Vascular imaging unremarkable.  Low inde of suspicion for acute neurovascular insult, but will recommend MRI brain to better elucidate his symptoms.      Diagnoses: L sided numbness.  No new Assessment & Plan notes have been filed under this hospital service since the last note was generated.  Service: Vascular Neurology      STROKE DOCUMENTATION     Acute Stroke Times:   Acute Stroke Times   Last Known Normal Date: 08/06/23  Last Known Normal Time: 1300  Symptom Onset Date: 08/06/23  Symptom Onset Time: 1300  Stroke Team Called Date: 08/06/23  Stroke Team Called Time: 1445  Stroke Team Arrival Date: 08/06/23  Stroke Team Arrival Time: 1450  CT Interpretation Time: 1450  Thrombolytic Therapy Recommended: No  CTA Interpretation Time: 1454  Thrombectomy Recommended: No    NIH Scale:  Interval: baseline  1a. Level of Consciousness: 0-->Alert, keenly responsive  1b. LOC Questions: 0-->Answers both questions correctly  1c. LOC Commands: 0-->Performs both tasks correctly  2. Best Gaze: 0-->Normal  3. Visual: 0-->No visual loss  4. Facial Palsy: 0-->Normal symmetrical movements  5a. Motor Arm, Left: 0-->No drift, limb holds 90 (or 45) degrees for full 10 secs  5b. Motor Arm, Right: 0-->No drift, limb holds 90 (or 45) degrees for full 10 secs  6a. Motor Leg, Left: 0-->No  "drift, leg holds 30 degree position for full 5 secs  6b. Motor Leg, Right: 0-->No drift, leg holds 30 degree position for full 5 secs  7. Limb Ataxia: 0-->Absent  8. Sensory: 1-->Mild-to-moderate sensory loss, patient feels pinprick is less sharp or is dull on the affected side, or there is a loss of superficial pain with pinprick, but patient is aware of being touched  9. Best Language: 0-->No aphasia, normal  10. Dysarthria: 0-->Normal  11. Extinction and Inattention (formerly Neglect): 0-->No abnormality  Total (NIH Stroke Scale): 1     Modified Fallon Score: 0  Robert Coma Scale:15   ABCD2 Score:    [id number] Score:   HAS -BLED Score:   ICH Score:   Hunt & Meyer Classification:       Blood pressure 126/65, pulse 88, temperature 98.6 °F (37 °C), temperature source Oral, resp. rate (!) 25, height 5' 11" (1.803 m), weight 120.2 kg (265 lb), SpO2 96 %.  Eligible for thrombolytic therapy?: No  Thrombolytic therapy recomended: Thrombolytic therapy not recommended due to Mild Non-Disabling Symptoms  Possible Interventional Revascularization Candidate? No; Large core infarct on imaging     Disposition Recommendation: admit to inpatient    Subjective:     History of Present Illness: 24 y/o with HTN, obesity, ETOH use, panic attacks, presents with acute onset L face and arm numbness. Never had similar symptoms before.    No notes on file      Woke up with symptoms?: no    Recent bleeding noted: no  Does the patient take any Blood Thinners? no  Medications: Antiplatelets:  NO      Past Medical History: hypertension and ETOH, obesity    Past Surgical History: no major surgeries within the last 2 weeks    Family History: no relevant history    Social History: drinking    Allergies: No Known Allergies     Review of Systems   Constitutional: Negative for chills, diaphoresis and fever.   HENT: Negative for hearing loss, tinnitus and trouble swallowing.    Eyes: Negative for visual disturbance.   Respiratory: Negative for " "shortness of breath.    Cardiovascular: Negative for chest pain and palpitations.   Gastrointestinal: Negative for blood in stool and vomiting.   Endocrine: Negative for cold intolerance.   Genitourinary: Negative for hematuria.   Musculoskeletal: Negative for gait problem and neck pain.   Allergic/Immunologic: Negative for immunocompromised state.   Neurological: Positive for numbness. Negative for dizziness, speech difficulty, weakness and headaches.   Hematological: Does not bruise/bleed easily.   Psychiatric/Behavioral: Negative for agitation, behavioral problems and confusion.     Objective:   Vitals: Blood pressure 126/65, pulse 88, temperature 98.6 °F (37 °C), temperature source Oral, resp. rate (!) 25, height 5' 11" (1.803 m), weight 120.2 kg (265 lb), SpO2 96 %.     CT READ: Yes  No hemmorhage. No mass effect. No early infarct signs.    CTA brain and neck: no LVO, no high grade stenosis, no carotid disease.    Physical Exam  Vitals and nursing note reviewed.   Constitutional:       Appearance: He is obese. He is not diaphoretic.   HENT:      Head: Normocephalic and atraumatic.      Nose: Nose normal.   Eyes:      Extraocular Movements: Extraocular movements intact.   Cardiovascular:      Rate and Rhythm: Normal rate and regular rhythm.   Pulmonary:      Effort: No respiratory distress.   Abdominal:      General: There is no distension.   Genitourinary:     Comments: na  Musculoskeletal:      Cervical back: Normal range of motion.      Right lower leg: No edema.      Left lower leg: No edema.   Skin:     Findings: No erythema or rash.   Neurological:      Mental Status: He is alert and oriented to person, place, and time.      Cranial Nerves: No cranial nerve deficit.      Sensory: Sensory deficit present.      Motor: No weakness.      Coordination: Coordination normal.   Psychiatric:         Mood and Affect: Mood normal.         Behavior: Behavior normal.             Recommended the emergency room physician " to have a brief discussion with the patient and/or family if available regarding the  risks and benefits of treatment, and to briefly document the occurrence of that discussion in his clinical encounter note.     The attending portion of this evaluation, treatment, and documentation was performed per Carmine Turpin MD via audiovisual.    Billing code:  (non-intervention mild to moderate stroke, TIA, some mimics)      This patient has a critical neurological condition/illness, with some potential for high morbidity and mortality.  There is a moderate probability for acute neurological change leading to clinical and possibly life-threatening deterioration requiring highest level of physician preparedness for urgent intervention.  Care was coordinated with other physicians involved in the patient's care.  Radiologic studies and laboratory data were reviewed and interpreted, and plan of care was re-assessed based on the results.  Diagnosis, treatment options and prognosis may have been discussed with the patient and/or family members or caregiver.    In your opinion, this was a: Tier 1 Van Negative    Consult End Time: 3 pm    Carmine Turpin MD  [facility]  Vascular Neurology   [facility]

## 2023-08-07 VITALS
BODY MASS INDEX: 36.7 KG/M2 | HEART RATE: 77 BPM | WEIGHT: 262.13 LBS | TEMPERATURE: 98 F | DIASTOLIC BLOOD PRESSURE: 83 MMHG | HEIGHT: 71 IN | RESPIRATION RATE: 20 BRPM | OXYGEN SATURATION: 97 % | SYSTOLIC BLOOD PRESSURE: 142 MMHG

## 2023-08-07 LAB
ALBUMIN SERPL BCP-MCNC: 3.4 G/DL (ref 3.5–5.2)
ALP SERPL-CCNC: 78 U/L (ref 55–135)
ALT SERPL W/O P-5'-P-CCNC: 23 U/L (ref 10–44)
ANION GAP SERPL CALC-SCNC: 2 MMOL/L (ref 8–16)
APTT PPP: 28 SEC (ref 21–32)
AST SERPL-CCNC: 23 U/L (ref 10–40)
BASOPHILS # BLD AUTO: 0.05 K/UL (ref 0–0.2)
BASOPHILS NFR BLD: 0.5 % (ref 0–1.9)
BILIRUB SERPL-MCNC: 0.5 MG/DL (ref 0.1–1)
BUN SERPL-MCNC: 14 MG/DL (ref 6–20)
CALCIUM SERPL-MCNC: 8.4 MG/DL (ref 8.7–10.5)
CHLORIDE SERPL-SCNC: 109 MMOL/L (ref 95–110)
CK MB SERPL-MCNC: 2 NG/ML (ref 0.1–6.5)
CO2 SERPL-SCNC: 28 MMOL/L (ref 23–29)
CREAT SERPL-MCNC: 1.2 MG/DL (ref 0.5–1.4)
DIFFERENTIAL METHOD: ABNORMAL
EOSINOPHIL # BLD AUTO: 0.5 K/UL (ref 0–0.5)
EOSINOPHIL NFR BLD: 4.8 % (ref 0–8)
ERYTHROCYTE [DISTWIDTH] IN BLOOD BY AUTOMATED COUNT: 13.2 % (ref 11.5–14.5)
EST. GFR  (NO RACE VARIABLE): >60 ML/MIN/1.73 M^2
GLUCOSE SERPL-MCNC: 101 MG/DL (ref 70–110)
HCT VFR BLD AUTO: 42 % (ref 40–54)
HGB BLD-MCNC: 14.3 G/DL (ref 14–18)
IMM GRANULOCYTES # BLD AUTO: 0.05 K/UL (ref 0–0.04)
IMM GRANULOCYTES NFR BLD AUTO: 0.5 % (ref 0–0.5)
INR PPP: 0.9 (ref 0.8–1.2)
LYMPHOCYTES # BLD AUTO: 2.3 K/UL (ref 1–4.8)
LYMPHOCYTES NFR BLD: 24 % (ref 18–48)
MAGNESIUM SERPL-MCNC: 1.8 MG/DL (ref 1.6–2.6)
MCH RBC QN AUTO: 31.2 PG (ref 27–31)
MCHC RBC AUTO-ENTMCNC: 34 G/DL (ref 32–36)
MCV RBC AUTO: 92 FL (ref 82–98)
MONOCYTES # BLD AUTO: 1 K/UL (ref 0.3–1)
MONOCYTES NFR BLD: 10.2 % (ref 4–15)
NEUTROPHILS # BLD AUTO: 5.6 K/UL (ref 1.8–7.7)
NEUTROPHILS NFR BLD: 60 % (ref 38–73)
NRBC BLD-RTO: 0 /100 WBC
PHOSPHATE SERPL-MCNC: 4 MG/DL (ref 2.7–4.5)
PLATELET # BLD AUTO: 231 K/UL (ref 150–450)
PMV BLD AUTO: 10.5 FL (ref 9.2–12.9)
POTASSIUM SERPL-SCNC: 3.7 MMOL/L (ref 3.5–5.1)
PROT SERPL-MCNC: 6.6 G/DL (ref 6–8.4)
PROTHROMBIN TIME: 10.3 SEC (ref 9–12.5)
RBC # BLD AUTO: 4.59 M/UL (ref 4.6–6.2)
SODIUM SERPL-SCNC: 139 MMOL/L (ref 136–145)
TROPONIN I SERPL HS-MCNC: 4.4 PG/ML (ref 0–14.9)
WBC # BLD AUTO: 9.41 K/UL (ref 3.9–12.7)

## 2023-08-07 PROCEDURE — 85730 THROMBOPLASTIN TIME PARTIAL: CPT | Performed by: INTERNAL MEDICINE

## 2023-08-07 PROCEDURE — 80053 COMPREHEN METABOLIC PANEL: CPT | Performed by: INTERNAL MEDICINE

## 2023-08-07 PROCEDURE — 84484 ASSAY OF TROPONIN QUANT: CPT | Performed by: INTERNAL MEDICINE

## 2023-08-07 PROCEDURE — 25000003 PHARM REV CODE 250: Performed by: INTERNAL MEDICINE

## 2023-08-07 PROCEDURE — 83735 ASSAY OF MAGNESIUM: CPT | Performed by: INTERNAL MEDICINE

## 2023-08-07 PROCEDURE — 82553 CREATINE MB FRACTION: CPT | Performed by: INTERNAL MEDICINE

## 2023-08-07 PROCEDURE — 85610 PROTHROMBIN TIME: CPT | Performed by: INTERNAL MEDICINE

## 2023-08-07 PROCEDURE — 36415 COLL VENOUS BLD VENIPUNCTURE: CPT | Performed by: INTERNAL MEDICINE

## 2023-08-07 PROCEDURE — 84100 ASSAY OF PHOSPHORUS: CPT | Performed by: INTERNAL MEDICINE

## 2023-08-07 PROCEDURE — G0378 HOSPITAL OBSERVATION PER HR: HCPCS

## 2023-08-07 PROCEDURE — 85025 COMPLETE CBC W/AUTO DIFF WBC: CPT | Performed by: INTERNAL MEDICINE

## 2023-08-07 RX ORDER — LISINOPRIL AND HYDROCHLOROTHIAZIDE 10; 12.5 MG/1; MG/1
1 TABLET ORAL DAILY
Qty: 30 TABLET | Refills: 1 | OUTPATIENT
Start: 2023-08-07 | End: 2023-08-07 | Stop reason: SDUPTHER

## 2023-08-07 RX ORDER — LISINOPRIL AND HYDROCHLOROTHIAZIDE 10; 12.5 MG/1; MG/1
1 TABLET ORAL DAILY
Qty: 30 TABLET | Refills: 1 | Status: SHIPPED | OUTPATIENT
Start: 2023-08-07

## 2023-08-07 RX ADMIN — ATORVASTATIN CALCIUM 40 MG: 40 TABLET, FILM COATED ORAL at 09:08

## 2023-08-07 RX ADMIN — ASPIRIN 81 MG: 81 TABLET, COATED ORAL at 09:08

## 2023-08-07 NOTE — NURSING
Nurses Note -- 4 Eyes      8/7/2023   2:55 AM      Skin assessed during: Admit      [x] No Altered Skin Integrity Present    []Prevention Measures Documented      [] Yes- Altered Skin Integrity Present or Discovered   [] LDA Added if Not in Epic (Describe Wound)   [] New Altered Skin Integrity was Present on Admit and Documented in LDA   [] Wound Image Taken    Wound Care Consulted? No    Attending Nurse:  Aamir Bustamante RN/Staff Member:   [id number]
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
## 2023-08-07 NOTE — PROGRESS NOTES
MRI of the brain was done. Patient came in with left sided numbness and tingling. Symptoms began earlier today. Patient has a h/x of hypertension.

## 2023-08-07 NOTE — HOSPITAL COURSE
Patient presented with left-sided numbness and was admitted for TIA workup.  He was evaluated by tele Neurology.  He was monitored on telemetry, CT showed no evidence of hemorrhage, MRI brain was done and showed no evidence of ischemia.  His symptoms resolved in the etiology is not clear.  He was discharged home in stable condition with PCP follow up.
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
## 2023-08-07 NOTE — H&P
[facility] Medicine  History & Physical    Patient Name: Seth Blair  MRN: [id number]  Patient Class: OP- Observation  Admission Date: 8/6/2023  Attending Physician: Cm Hollins MD   Primary Care Provider: Rachel Primary Doctor         Patient information was obtained from patient and ER records.     Subjective:     Principal Problem:Left sided numbness    Chief Complaint:   Chief Complaint   Patient presents with    Numbness     L ARM AND LEG ONSET 12, STATES FELL TO GROUND         HPI: 23 year old patient getting admitted with L sided numbness and weakness  Pt was riding a boat  and suddenly felt LUE weakness along with Numbness   Later he had numbness on L side of face and later on LLE  Pt lost  and strength and lost control of boar for a while  Whole episode lasted for 10 minutes   Later pts condition came back to baseline   Pt came to ER and was evaluated by Tele stroke  team and recommended MRI brain  Pts was back to baseline status when saw in ER       Past Medical History:   Diagnosis Date    Essential (primary) hypertension     HLD (hyperlipidemia)     Renal agenesis        Past Surgical History:   Procedure Laterality Date    DENTAL SURGERY         Review of patient's allergies indicates:  No Known Allergies    No current facility-administered medications on file prior to encounter.     Current Outpatient Medications on File Prior to Encounter   Medication Sig    lisinopriL-hydrochlorothiazide (PRINZIDE,ZESTORETIC) 10-12.5 mg per tablet Take 1 tablet by mouth once daily.    sulfamethoxazole-trimethoprim 800-160mg (BACTRIM DS) 800-160 mg Tab Take 1 tablet by mouth 2 (two) times daily.     Family History       Problem Relation (Age of Onset)    Hypertension Mother, Father          Tobacco Use    Smoking status: Never    Smokeless tobacco: Not on file   Substance and Sexual Activity    Alcohol use: No    Drug use: Not on file    Sexual activity: Not on file     Review  of Systems   Constitutional:  Negative for activity change and appetite change.   HENT:  Negative for congestion and dental problem.    Eyes:  Negative for discharge and itching.   Respiratory:  Negative for shortness of breath.    Cardiovascular:  Negative for chest pain.   Gastrointestinal:  Negative for abdominal distention and abdominal pain.   Endocrine: Negative for cold intolerance.   Genitourinary:  Negative for difficulty urinating and dysuria.   Musculoskeletal:  Negative for arthralgias and back pain.   Skin:  Negative for color change.   Neurological:  Positive for weakness and numbness. Negative for dizziness and facial asymmetry.   Hematological:  Negative for adenopathy.   Psychiatric/Behavioral:  Negative for agitation and behavioral problems.      Objective:     Vital Signs (Most Recent):  Temp: 98.6 °F (37 °C) (08/06/23 2025)  Pulse: 72 (08/06/23 2100)  Resp: 19 (08/06/23 2100)  BP: (!) 147/86 (08/06/23 2025)  SpO2: 98 % (08/06/23 2100) Vital Signs (24h Range):  Temp:  [98.6 °F (37 °C)] 98.6 °F (37 °C)  Pulse:  [72-94] 72  Resp:  [16-30] 19  SpO2:  [96 %-98 %] 98 %  BP: (120-147)/(65-97) 147/86     Weight: 118.9 kg (262 lb 1.6 oz)  Body mass index is 36.56 kg/m².     Physical Exam  Vitals and nursing note reviewed.   Constitutional:       Appearance: He is well-developed.   HENT:      Head: Atraumatic.      Right Ear: External ear normal.      Left Ear: External ear normal.      Nose: Nose normal.      Mouth/Throat:      Mouth: Mucous membranes are moist.   Cardiovascular:      Rate and Rhythm: Normal rate.   Pulmonary:      Effort: Pulmonary effort is normal.   Musculoskeletal:         General: Normal range of motion.      Cervical back: Full passive range of motion without pain and normal range of motion.   Skin:     General: Skin is warm.   Neurological:      Mental Status: He is alert and oriented to person, place, and time.   Psychiatric:         Behavior: Behavior normal.                 Significant Labs: All pertinent labs within the past 24 hours have been reviewed.  CBC:   Recent Labs   Lab 08/06/23  1415   WBC 11.19   HGB 14.8   HCT 42.9        CMP:   Recent Labs   Lab 08/06/23  1415      K 3.8      CO2 26   GLU 87   BUN 12   CREATININE 1.3   CALCIUM 9.0   PROT 7.7   ALBUMIN 4.3   BILITOT 0.6   ALKPHOS 89   AST 27   ALT 29   ANIONGAP 5*       Significant Imaging: I have reviewed all pertinent imaging results/findings within the past 24 hours.    Assessment/Plan:     * Left sided numbness  Assessed by Tele Stroke MD and recommended MRI brain  All imaging so far normal  Pt said he was hydrating himself all day  If stable home tomorrow       Obesity  Body mass index is 36.56 kg/m². Morbid obesity complicates all aspects of disease management from diagnostic modalities to treatment.      HLD (hyperlipidemia)  Pt claims he has HLD      HTN (hypertension)  Pt claims he has HTN        VTE Risk Mitigation (From admission, onward)         Ordered     IP VTE HIGH RISK PATIENT  Once         08/06/23 1604     Place sequential compression device  Until discontinued         08/06/23 1604                   On 08/06/2023, patient should be placed in hospital observation services under my care.        Cm Hollins MD  Department of Hospital Medicine  [facility]

## 2023-08-07 NOTE — CARE UPDATE
08/06/23 2100   Patient Assessment/Suction   Level of Consciousness (AVPU) alert   Respiratory Effort Normal   PRE-TX-O2   Device (Oxygen Therapy) room air   SpO2 98 %   Pulse Oximetry Type Intermittent   $ Pulse Oximetry - Single Charge Pulse Oximetry - Single   Pulse 72   Resp 19   Respiratory Evaluation   $ Care Plan Tech Time 15 min

## 2023-08-07 NOTE — DISCHARGE SUMMARY
[facility] Medicine  Discharge Summary      Patient Name: Seth Blair  MRN: [id number]  FRANKLYN: [id number]  Patient Class: OP- Observation  Admission Date: 8/6/2023  Hospital Length of Stay: 0 days  Discharge Date and Time: 8/7/2023 12:03 PM  Attending Physician: Rachel att. providers found   Discharging Provider: Debbie Douglas MD  Primary Care Provider: Rachel, Primary Doctor    Primary Care Team: Networked reference to record PCT     HPI:   23 year old patient getting admitted with L sided numbness and weakness  Pt was riding a boat  and suddenly felt LUE weakness along with Numbness   Later he had numbness on L side of face and later on LLE  Pt lost  and strength and lost control of boar for a while  Whole episode lasted for 10 minutes   Later pts condition came back to baseline   Pt came to ER and was evaluated by Tele stroke  team and recommended MRI brain  Pts was back to baseline status when saw in ER       * No surgery found *      Hospital Course:   Patient presented with left-sided numbness and was admitted for TIA workup.  He was evaluated by tele Neurology.  He was monitored on telemetry, CT showed no evidence of hemorrhage, MRI brain was done and showed no evidence of ischemia.  His symptoms resolved in the etiology is not clear.  He was discharged home in stable condition with PCP follow up.       Goals of Care Treatment Preferences:  Code Status: Full Code      Consults:   Consults (From admission, onward)        Status Ordering Provider     Inpatient consult to Neurology  Once        Provider:  Wilda Bentley MD    Completed CLARENCE KOCH     IP consult to case management/social work  Once        Provider:  (Not yet assigned)    Completed CLARENCE KOCH          No new Assessment & Plan notes have been filed under this hospital service since the last note was generated.  Service: Hospital Medicine    Final Active Diagnoses:    Diagnosis Date Noted POA    PRINCIPAL PROBLEM:  Left  sided numbness [R20.0] 08/06/2023 Unknown    HTN (hypertension) [I10] 08/06/2023 Unknown    HLD (hyperlipidemia) [E78.5] 08/06/2023 Unknown    Obesity [E66.9] 08/06/2023 Unknown      Problems Resolved During this Admission:       Discharged Condition: good    Disposition: Home or Self Care    Follow Up:   Follow-up Information     No, Primary Doctor Follow up.           [facility]. Go on 8/14/2023.    Why: 10:30pm, Hospital follow up  Contact information:  [address] [facility] [id number]  [phone]                       Patient Instructions:   No discharge procedures on file.    Significant Diagnostic Studies: N/A    Pending Diagnostic Studies:     None         Medications:  Reconciled Home Medications:      Medication List      CONTINUE taking these medications    lisinopriL-hydrochlorothiazide 10-12.5 mg per tablet  Commonly known as: PRINZIDE,ZESTORETIC  Take 1 tablet by mouth once daily.        STOP taking these medications    sulfamethoxazole-trimethoprim 800-160mg 800-160 mg Tab  Commonly known as: BACTRIM DS            Indwelling Lines/Drains at time of discharge:   Lines/Drains/Airways     None                        Debbie Douglas MD  Department of Hospital Medicine  [facility]

## 2023-08-07 NOTE — NURSING
Pt prepared for discharge. Discharge educated completed, tele box removed and returned to monitor room. Peripheral IV removed and tolerated well. Pt declined the use of a wheelchair.

## 2023-08-07 NOTE — SUBJECTIVE & OBJECTIVE
Past Medical History:   Diagnosis Date    Essential (primary) hypertension     HLD (hyperlipidemia)     Renal agenesis        Past Surgical History:   Procedure Laterality Date    DENTAL SURGERY         Review of patient's allergies indicates:  No Known Allergies    No current facility-administered medications on file prior to encounter.     Current Outpatient Medications on File Prior to Encounter   Medication Sig    lisinopriL-hydrochlorothiazide (PRINZIDE,ZESTORETIC) 10-12.5 mg per tablet Take 1 tablet by mouth once daily.    sulfamethoxazole-trimethoprim 800-160mg (BACTRIM DS) 800-160 mg Tab Take 1 tablet by mouth 2 (two) times daily.     Family History       Problem Relation (Age of Onset)    Hypertension Mother, Father          Tobacco Use    Smoking status: Never    Smokeless tobacco: Not on file   Substance and Sexual Activity    Alcohol use: No    Drug use: Not on file    Sexual activity: Not on file     Review of Systems   Constitutional:  Negative for activity change and appetite change.   HENT:  Negative for congestion and dental problem.    Eyes:  Negative for discharge and itching.   Respiratory:  Negative for shortness of breath.    Cardiovascular:  Negative for chest pain.   Gastrointestinal:  Negative for abdominal distention and abdominal pain.   Endocrine: Negative for cold intolerance.   Genitourinary:  Negative for difficulty urinating and dysuria.   Musculoskeletal:  Negative for arthralgias and back pain.   Skin:  Negative for color change.   Neurological:  Positive for weakness and numbness. Negative for dizziness and facial asymmetry.   Hematological:  Negative for adenopathy.   Psychiatric/Behavioral:  Negative for agitation and behavioral problems.      Objective:     Vital Signs (Most Recent):  Temp: 98.6 °F (37 °C) (08/06/23 2025)  Pulse: 72 (08/06/23 2100)  Resp: 19 (08/06/23 2100)  BP: (!) 147/86 (08/06/23 2025)  SpO2: 98 % (08/06/23 2100) Vital Signs (24h Range):  Temp:  [98.6 °F (37  °C)] 98.6 °F (37 °C)  Pulse:  [72-94] 72  Resp:  [16-30] 19  SpO2:  [96 %-98 %] 98 %  BP: (120-147)/(65-97) 147/86     Weight: 118.9 kg (262 lb 1.6 oz)  Body mass index is 36.56 kg/m².     Physical Exam  Vitals and nursing note reviewed.   Constitutional:       Appearance: He is well-developed.   HENT:      Head: Atraumatic.      Right Ear: External ear normal.      Left Ear: External ear normal.      Nose: Nose normal.      Mouth/Throat:      Mouth: Mucous membranes are moist.   Cardiovascular:      Rate and Rhythm: Normal rate.   Pulmonary:      Effort: Pulmonary effort is normal.   Musculoskeletal:         General: Normal range of motion.      Cervical back: Full passive range of motion without pain and normal range of motion.   Skin:     General: Skin is warm.   Neurological:      Mental Status: He is alert and oriented to person, place, and time.   Psychiatric:         Behavior: Behavior normal.                Significant Labs: All pertinent labs within the past 24 hours have been reviewed.  CBC:   Recent Labs   Lab 08/06/23  1415   WBC 11.19   HGB 14.8   HCT 42.9        CMP:   Recent Labs   Lab 08/06/23  1415      K 3.8      CO2 26   GLU 87   BUN 12   CREATININE 1.3   CALCIUM 9.0   PROT 7.7   ALBUMIN 4.3   BILITOT 0.6   ALKPHOS 89   AST 27   ALT 29   ANIONGAP 5*       Significant Imaging: I have reviewed all pertinent imaging results/findings within the past 24 hours.

## 2023-08-07 NOTE — ASSESSMENT & PLAN NOTE
Assessed by Tele Stroke MD and recommended MRI brain  All imaging so far normal  Pt said he was hydrating himself all day  If stable home tomorrow

## 2023-08-07 NOTE — ASSESSMENT & PLAN NOTE
Body mass index is 36.56 kg/m². Morbid obesity complicates all aspects of disease management from diagnostic modalities to treatment.

## 2023-08-07 NOTE — PLAN OF CARE
08/07/23 1027   Final Note   Assessment Type Final Discharge Note   Anticipated Discharge Disposition Home   What phone number can be called within the next 1-3 days to see how you are doing after discharge? [phone]   Hospital Resources/Appts/Education Provided Appointments scheduled and added to AVS  (office to contact pt to schedule)   Post-Acute Status   Discharge Delays None known at this time     Patient cleared for discharge from case management standpoint.  Chart and discharge orders reviewed.  Patient discharged home with no further case management needs.

## 2023-08-13 ENCOUNTER — HOSPITAL ENCOUNTER (EMERGENCY)
Facility: HOSPITAL | Age: 23
Discharge: HOME | End: 2023-08-13
Attending: STUDENT IN AN ORGANIZED HEALTH CARE EDUCATION/TRAINING PROGRAM

## 2023-08-13 VITALS
TEMPERATURE: 99 F | HEIGHT: 71 IN | BODY MASS INDEX: 36.68 KG/M2 | WEIGHT: 262 LBS | RESPIRATION RATE: 18 BRPM | SYSTOLIC BLOOD PRESSURE: 126 MMHG | HEART RATE: 56 BPM | DIASTOLIC BLOOD PRESSURE: 61 MMHG | OXYGEN SATURATION: 95 %

## 2023-08-13 DIAGNOSIS — G43.809 OTHER MIGRAINE WITHOUT STATUS MIGRAINOSUS, NOT INTRACTABLE: Primary | ICD-10-CM

## 2023-08-13 DIAGNOSIS — R20.0 LEFT SIDED NUMBNESS: ICD-10-CM

## 2023-08-13 LAB
ALBUMIN SERPL BCP-MCNC: 4.5 G/DL (ref 3.5–5.2)
ALP SERPL-CCNC: 87 U/L (ref 55–135)
ALT SERPL W/O P-5'-P-CCNC: 44 U/L (ref 10–44)
ANION GAP SERPL CALC-SCNC: 10 MMOL/L (ref 8–16)
AST SERPL-CCNC: 28 U/L (ref 10–40)
BASOPHILS # BLD AUTO: 0.05 K/UL (ref 0–0.2)
BASOPHILS NFR BLD: 0.4 % (ref 0–1.9)
BILIRUB SERPL-MCNC: 0.5 MG/DL (ref 0.1–1)
BUN SERPL-MCNC: 17 MG/DL (ref 6–20)
CALCIUM SERPL-MCNC: 9.5 MG/DL (ref 8.7–10.5)
CHLORIDE SERPL-SCNC: 103 MMOL/L (ref 95–110)
CHOLEST SERPL-MCNC: 231 MG/DL (ref 120–199)
CHOLEST/HDLC SERPL: 6.1 {RATIO} (ref 2–5)
CO2 SERPL-SCNC: 25 MMOL/L (ref 23–29)
CREAT SERPL-MCNC: 1.2 MG/DL (ref 0.5–1.4)
CREAT SERPL-MCNC: 1.2 MG/DL (ref 0.5–1.4)
DIFFERENTIAL METHOD: ABNORMAL
EOSINOPHIL # BLD AUTO: 0.4 K/UL (ref 0–0.5)
EOSINOPHIL NFR BLD: 3.2 % (ref 0–8)
ERYTHROCYTE [DISTWIDTH] IN BLOOD BY AUTOMATED COUNT: 13 % (ref 11.5–14.5)
EST. GFR  (NO RACE VARIABLE): >60 ML/MIN/1.73 M^2
GLUCOSE SERPL-MCNC: 107 MG/DL (ref 70–110)
GLUCOSE SERPL-MCNC: 108 MG/DL (ref 70–110)
HCT VFR BLD AUTO: 47.2 % (ref 40–54)
HDLC SERPL-MCNC: 38 MG/DL (ref 40–75)
HDLC SERPL: 16.5 % (ref 20–50)
HGB BLD-MCNC: 16.4 G/DL (ref 14–18)
IMM GRANULOCYTES # BLD AUTO: 0.04 K/UL (ref 0–0.04)
IMM GRANULOCYTES NFR BLD AUTO: 0.3 % (ref 0–0.5)
INR PPP: 0.9 (ref 0.8–1.2)
LDLC SERPL CALC-MCNC: 139 MG/DL (ref 63–159)
LYMPHOCYTES # BLD AUTO: 3.1 K/UL (ref 1–4.8)
LYMPHOCYTES NFR BLD: 24.2 % (ref 18–48)
MCH RBC QN AUTO: 30.9 PG (ref 27–31)
MCHC RBC AUTO-ENTMCNC: 34.7 G/DL (ref 32–36)
MCV RBC AUTO: 89 FL (ref 82–98)
MONOCYTES # BLD AUTO: 1.1 K/UL (ref 0.3–1)
MONOCYTES NFR BLD: 8.6 % (ref 4–15)
NEUTROPHILS # BLD AUTO: 8.2 K/UL (ref 1.8–7.7)
NEUTROPHILS NFR BLD: 63.3 % (ref 38–73)
NONHDLC SERPL-MCNC: 193 MG/DL
NRBC BLD-RTO: 0 /100 WBC
PLATELET # BLD AUTO: 278 K/UL (ref 150–450)
PMV BLD AUTO: 10.4 FL (ref 9.2–12.9)
POTASSIUM SERPL-SCNC: 3.4 MMOL/L (ref 3.5–5.1)
PROT SERPL-MCNC: 8.4 G/DL (ref 6–8.4)
PROTHROMBIN TIME: 10.3 SEC (ref 9–12.5)
RBC # BLD AUTO: 5.31 M/UL (ref 4.6–6.2)
SAMPLE: NORMAL
SODIUM SERPL-SCNC: 138 MMOL/L (ref 136–145)
TRIGL SERPL-MCNC: 270 MG/DL (ref 30–150)
TSH SERPL DL<=0.005 MIU/L-ACNC: 4.39 UIU/ML (ref 0.34–5.6)
WBC # BLD AUTO: 12.96 K/UL (ref 3.9–12.7)

## 2023-08-13 PROCEDURE — 25000003 PHARM REV CODE 250: Performed by: EMERGENCY MEDICINE

## 2023-08-13 PROCEDURE — 93010 ELECTROCARDIOGRAM REPORT: CPT | Mod: ,,, | Performed by: INTERNAL MEDICINE

## 2023-08-13 PROCEDURE — 82962 GLUCOSE BLOOD TEST: CPT

## 2023-08-13 PROCEDURE — 25500020 PHARM REV CODE 255: Performed by: STUDENT IN AN ORGANIZED HEALTH CARE EDUCATION/TRAINING PROGRAM

## 2023-08-13 PROCEDURE — 85025 COMPLETE CBC W/AUTO DIFF WBC: CPT | Performed by: STUDENT IN AN ORGANIZED HEALTH CARE EDUCATION/TRAINING PROGRAM

## 2023-08-13 PROCEDURE — 84443 ASSAY THYROID STIM HORMONE: CPT | Performed by: STUDENT IN AN ORGANIZED HEALTH CARE EDUCATION/TRAINING PROGRAM

## 2023-08-13 PROCEDURE — G0425 INPT/ED TELECONSULT30: HCPCS | Mod: GT,,, | Performed by: PSYCHIATRY & NEUROLOGY

## 2023-08-13 PROCEDURE — 82565 ASSAY OF CREATININE: CPT

## 2023-08-13 PROCEDURE — 85610 PROTHROMBIN TIME: CPT | Performed by: STUDENT IN AN ORGANIZED HEALTH CARE EDUCATION/TRAINING PROGRAM

## 2023-08-13 PROCEDURE — 99285 EMERGENCY DEPT VISIT HI MDM: CPT | Mod: 25

## 2023-08-13 PROCEDURE — 80061 LIPID PANEL: CPT | Performed by: STUDENT IN AN ORGANIZED HEALTH CARE EDUCATION/TRAINING PROGRAM

## 2023-08-13 PROCEDURE — 93005 ELECTROCARDIOGRAM TRACING: CPT | Performed by: INTERNAL MEDICINE

## 2023-08-13 PROCEDURE — G0425 PR INPT TELEHEALTH CONSULT 30M: ICD-10-PCS | Mod: GT,,, | Performed by: PSYCHIATRY & NEUROLOGY

## 2023-08-13 PROCEDURE — 93010 EKG 12-LEAD: ICD-10-PCS | Mod: ,,, | Performed by: INTERNAL MEDICINE

## 2023-08-13 PROCEDURE — 80053 COMPREHEN METABOLIC PANEL: CPT | Performed by: STUDENT IN AN ORGANIZED HEALTH CARE EDUCATION/TRAINING PROGRAM

## 2023-08-13 RX ORDER — POTASSIUM CHLORIDE 750 MG/1
50 CAPSULE, EXTENDED RELEASE ORAL ONCE
Status: COMPLETED | OUTPATIENT
Start: 2023-08-13 | End: 2023-08-13

## 2023-08-13 RX ORDER — BUTALBITAL, ACETAMINOPHEN AND CAFFEINE 50; 325; 40 MG/1; MG/1; MG/1
1 TABLET ORAL EVERY 4 HOURS PRN
Qty: 15 TABLET | Refills: 0 | Status: SHIPPED | OUTPATIENT
Start: 2023-08-13 | End: 2023-09-12

## 2023-08-13 RX ORDER — ASPIRIN 325 MG
325 TABLET ORAL
Status: COMPLETED | OUTPATIENT
Start: 2023-08-13 | End: 2023-08-13

## 2023-08-13 RX ADMIN — POTASSIUM CHLORIDE 50 MEQ: 750 CAPSULE, EXTENDED RELEASE ORAL at 09:08

## 2023-08-13 RX ADMIN — IOHEXOL 100 ML: 350 INJECTION, SOLUTION INTRAVENOUS at 03:08

## 2023-08-13 RX ADMIN — ASPIRIN 325 MG ORAL TABLET 325 MG: 325 PILL ORAL at 06:08

## 2023-08-13 NOTE — Clinical Note
"Seth Houser" Johnnie was seen and treated in our emergency department on 8/13/2023.  He may return to work on 08/16/2023.       If you have any questions or concerns, please don't hesitate to call.      Manohar Drake MD"

## 2023-08-13 NOTE — SUBJECTIVE & OBJECTIVE
"  Woke up with symptoms?: no    Recent bleeding noted: no  Does the patient take any Blood Thinners? no  Medications: Antiplatelets:  aspirin      Past Medical History: hypertension, hyperlipidemia and panic attacks    Past Surgical History: no major surgeries within the last 2 weeks    Family History: no relevant history    Social History: drinking    Allergies: No Known Allergies     Review of Systems   Constitutional: Negative for diaphoresis and fever.   HENT: Negative for hearing loss, tinnitus and trouble swallowing.    Eyes: Negative for visual disturbance.   Respiratory: Negative for shortness of breath.    Cardiovascular: Negative for chest pain and palpitations.   Gastrointestinal: Negative for blood in stool and vomiting.   Endocrine: Negative for cold intolerance.   Genitourinary: Negative for hematuria.   Musculoskeletal: Negative for gait problem and neck pain.   Skin: Negative for pallor and rash.   Allergic/Immunologic: Negative for immunocompromised state.   Neurological: Positive for weakness and numbness. Negative for dizziness, facial asymmetry, speech difficulty and headaches.   Hematological: Does not bruise/bleed easily.   Psychiatric/Behavioral: Negative for agitation, behavioral problems and confusion.     Objective:   Vitals: Blood pressure (!) 163/100, pulse 83, resp. rate 13, height 5' 11" (1.803 m), weight 118.8 kg (262 lb), SpO2 98 %.     CT READ: yes, no acute abnormality  CTA brain and neck: sub optimal study, no sufficient contrast administered.     Physical Exam  Vitals and nursing note reviewed.   Constitutional:       Appearance: Normal appearance. He is obese. He is not diaphoretic.   HENT:      Head: Normocephalic and atraumatic.      Nose: Nose normal.   Eyes:      Extraocular Movements: Extraocular movements intact.   Cardiovascular:      Rate and Rhythm: Normal rate and regular rhythm.   Pulmonary:      Effort: No respiratory distress.   Abdominal:      General: There is no " distension.   Genitourinary:     Comments: na  Musculoskeletal:      Cervical back: Normal range of motion.      Right lower leg: No edema.      Left lower leg: No edema.   Skin:     Findings: No erythema or rash.   Neurological:      Mental Status: He is alert and oriented to person, place, and time.      Cranial Nerves: No cranial nerve deficit.      Sensory: Sensory deficit present.      Motor: Weakness present.      Coordination: Coordination normal.   Psychiatric:         Mood and Affect: Mood normal.         Behavior: Behavior normal.

## 2023-08-13 NOTE — CONSULTS
[facility]  Vascular Neurology  Comprehensive Stroke Center  TeleVascular Neurology Acute Consultation Note      Consults    Consulting Provider: JAH HERNANDEZ  Current Providers  No providers found    Patient Location:  [facility] EMERGENCY DEPARTMENT Emergency Department  Spoke hospital nurse at bedside with patient assisting consultant.     Patient information was obtained from patient, past medical records, and primary team.         Assessment/Plan:   24 y/o with HTN, HLD, severe obesity, ETOH use, panic attacks, presents with acute onset L sided numbness and weakness that started earlier this morning while he was trying to fall asleep.  NIHSS 8 but it appears to me that he is not giving full effort on motor strength testing.   NCCT head without acute abnormality. CTA brain and neck is sub optimal due to no enough contrast administration resulting on poor opacification of intracranial and extracranial vessels.  Recurrent motor and sensory L kimber body symptoms with no full effort on motor testing, thus don't feel strongly about pursuing treatment with TNK. Recommended STAT MRI/MRA brain and then decide whether or not treatment TNK or EVT warranted.      Diagnoses: L sided weakness and numbness.   No new Assessment & Plan notes have been filed under this hospital service since the last note was generated.  Service: Vascular Neurology      STROKE DOCUMENTATION     Acute Stroke Times:   Acute Stroke Times   Last Known Normal Date: 08/13/23  Last Known Normal Time: 0210  Symptom Onset Date: 08/13/23  Symptom Onset Time: 0210  Stroke Team Called Date: 08/13/23  Stroke Team Called Time: 0323  Stroke Team Arrival Date: 08/13/23  Stroke Team Arrival Time: 0330  CT Interpretation Time: 0340  Thrombolytic Therapy Recommended: No  CTA Interpretation Time: 0340  Thrombectomy Recommended: No    NIH Scale:  Interval: baseline  1a. Level of Consciousness: 0-->Alert, keenly responsive  1b.  "LOC Questions: 0-->Answers both questions correctly  1c. LOC Commands: 0-->Performs both tasks correctly  2. Best Gaze: 0-->Normal  3. Visual: 0-->No visual loss  4. Facial Palsy: 0-->Normal symmetrical movements  5a. Motor Arm, Left: 0-->No drift, limb holds 90 (or 45) degrees for full 10 secs  5b. Motor Arm, Right: 0-->No drift, limb holds 90 (or 45) degrees for full 10 secs  6a. Motor Leg, Left: 4-->No movement  6b. Motor Leg, Right: 3-->No effort against gravity, leg falls to bed immediately  7. Limb Ataxia: 0-->Absent  8. Sensory: 1-->Mild-to-moderate sensory loss, patient feels pinprick is less sharp or is dull on the affected side, or there is a loss of superficial pain with pinprick, but patient is aware of being touched  9. Best Language: 0-->No aphasia, normal  10. Dysarthria: 0-->Normal  11. Extinction and Inattention (formerly Neglect): 0-->No abnormality  Total (NIH Stroke Scale): 8     Modified Tazewell Score: 0  Bowden Coma Scale:15   ABCD2 Score:    [id number] Score:   HAS -BLED Score:   ICH Score:   Hunt & Meyer Classification:       Blood pressure (!) 163/100, pulse 83, resp. rate 13, height 5' 11" (1.803 m), weight 118.8 kg (262 lb), SpO2 98 %.  Eligible for thrombolytic therapy?: No  Thrombolytic therapy recomended: Thrombolytic therapy not recommended due to Suspected stroke mimic   Possible Interventional Revascularization Candidate?  Awaiting MRI/MRA    Disposition Recommendation: pending further studies    Subjective:     History of Present Illness: 22 y/o with HTN, HLD, severe obesity, ETOH use, panic attacks, presents with acute onset L sided numbness and weakness that started earlier this morning while he was trying to fall asleep.  Had similar symptoms on 8/7 that prompted his admission to the hospital and had normal CT/CTA brain.neck and normal MRI brain.  No improving.      No notes on file      Woke up with symptoms?: no    Recent bleeding noted: no  Does the patient take any Blood " "Thinners? no  Medications: Antiplatelets:  aspirin      Past Medical History: hypertension, hyperlipidemia and panic attacks    Past Surgical History: no major surgeries within the last 2 weeks    Family History: no relevant history    Social History: drinking    Allergies: No Known Allergies     Review of Systems   Constitutional: Negative for diaphoresis and fever.   HENT: Negative for hearing loss, tinnitus and trouble swallowing.    Eyes: Negative for visual disturbance.   Respiratory: Negative for shortness of breath.    Cardiovascular: Negative for chest pain and palpitations.   Gastrointestinal: Negative for blood in stool and vomiting.   Endocrine: Negative for cold intolerance.   Genitourinary: Negative for hematuria.   Musculoskeletal: Negative for gait problem and neck pain.   Skin: Negative for pallor and rash.   Allergic/Immunologic: Negative for immunocompromised state.   Neurological: Positive for weakness and numbness. Negative for dizziness, facial asymmetry, speech difficulty and headaches.   Hematological: Does not bruise/bleed easily.   Psychiatric/Behavioral: Negative for agitation, behavioral problems and confusion.     Objective:   Vitals: Blood pressure (!) 163/100, pulse 83, resp. rate 13, height 5' 11" (1.803 m), weight 118.8 kg (262 lb), SpO2 98 %.     CT READ: yes, no acute abnormality  CTA brain and neck: sub optimal study, no sufficient contrast administered.     Physical Exam  Vitals and nursing note reviewed.   Constitutional:       Appearance: Normal appearance. He is obese. He is not diaphoretic.   HENT:      Head: Normocephalic and atraumatic.      Nose: Nose normal.   Eyes:      Extraocular Movements: Extraocular movements intact.   Cardiovascular:      Rate and Rhythm: Normal rate and regular rhythm.   Pulmonary:      Effort: No respiratory distress.   Abdominal:      General: There is no distension.   Genitourinary:     Comments: na  Musculoskeletal:      Cervical back: Normal " range of motion.      Right lower leg: No edema.      Left lower leg: No edema.   Skin:     Findings: No erythema or rash.   Neurological:      Mental Status: He is alert and oriented to person, place, and time.      Cranial Nerves: No cranial nerve deficit.      Sensory: Sensory deficit present.      Motor: Weakness present.      Coordination: Coordination normal.   Psychiatric:         Mood and Affect: Mood normal.         Behavior: Behavior normal.             Recommended the emergency room physician to have a brief discussion with the patient and/or family if available regarding the  risks and benefits of treatment, and to briefly document the occurrence of that discussion in his clinical encounter note.     The attending portion of this evaluation, treatment, and documentation was performed per Carmine Turpin MD via audiovisual.    Billing code:  (non-intervention mild to moderate stroke, TIA, some mimics)      This patient has a critical neurological condition/illness, with some potential for high morbidity and mortality.  There is a moderate probability for acute neurological change leading to clinical and possibly life-threatening deterioration requiring highest level of physician preparedness for urgent intervention.  Care was coordinated with other physicians involved in the patient's care.  Radiologic studies and laboratory data were reviewed and interpreted, and plan of care was re-assessed based on the results.  Diagnosis, treatment options and prognosis may have been discussed with the patient and/or family members or caregiver.    In your opinion, this was a: Tier 1 Van Negative    Consult End Time: 3:51 AM     Carmine Turpin MD  [facility] Stroke Center  Vascular Neurology   [facility]

## 2023-08-13 NOTE — ED PROVIDER NOTES
Encounter Date: 8/13/2023       History     Chief Complaint   Patient presents with    Cerebrovascular Accident     Weakness/decreased sensation to Lt side x 1 hr     HPI    Seth Blair is a 23 y.o. male with past medical history of hypertension, chronic migraines, hyperlipidemia, and recent hospitalization for left-sided weakness and numbness the presented emergency department for evaluation of left-sided weakness and numbness x1 hour prior to arrival.  One week ago, patient presents emergency department for evaluation of left-sided numbness.  His symptoms lasted approximately 10 minutes and resolved without intervention.  He had stroke workup performed at that time which included CT head, CT angio, and MRI which were all negative.  Ultimately discharged home.  Patient states that he had some residual weakness on the left.  He re-presented today with dense left-sided weakness and numbness.  This began 1 hour prior to arrival.  Patient states that he was trying to fall asleep and he started feeling strange.  Then noticed that he was having difficulty with moving his left side.  Presented to the emergency department following this.  Endorsing intermittent blurry vision.  Denies fever, chest pain, shortness of breath, and headache.      Review of patient's allergies indicates:  No Known Allergies  Past Medical History:   Diagnosis Date    Essential (primary) hypertension     HLD (hyperlipidemia)     Hypertension     Renal agenesis      Past Surgical History:   Procedure Laterality Date    DENTAL SURGERY       Family History   Problem Relation Age of Onset    Hypertension Mother     Hypertension Father      Social History     Tobacco Use    Smoking status: Never   Substance Use Topics    Alcohol use: No    Drug use: Never     Review of Systems   Unable to perform ROS: Acuity of condition       Physical Exam     Initial Vitals   BP Pulse Resp Temp SpO2   08/13/23 0308 08/13/23 0308 08/13/23 0308 08/13/23 0706  08/13/23 0308   (!) 163/100 101 18 98.7 °F (37.1 °C) 98 %      MAP       --                Physical Exam    Nursing note and vitals reviewed.  Constitutional: He appears well-developed and well-nourished.   HENT:   Head: Normocephalic and atraumatic.   Eyes: EOM are normal. Pupils are equal, round, and reactive to light.   Neck:   Normal range of motion.  Cardiovascular:  Normal rate, regular rhythm and normal heart sounds.           Pulmonary/Chest: Breath sounds normal. No respiratory distress. He has no wheezes. He has no rhonchi. He has no rales.   Abdominal: Abdomen is soft. He exhibits no distension. There is no abdominal tenderness. There is no rebound and no guarding.   Musculoskeletal:         General: Normal range of motion.      Cervical back: Normal range of motion.     Neurological: He is alert and oriented to person, place, and time. A cranial nerve deficit and sensory deficit is present. GCS score is 15. GCS eye subscore is 4. GCS verbal subscore is 5. GCS motor subscore is 6.   2/5 strength in the left upper and left lower extremities.  Decreased sensation in the left upper and left lower extremity.  Decreased sensation of the left face.  Inability to move either eyebrow.   Skin: Capillary refill takes less than 2 seconds. No rash noted.   Psychiatric: He has a normal mood and affect.         ED Course   Critical Care    Date/Time: 8/13/2023 8:42 AM    Performed by: Manohar Drake MD  Authorized by: Manohar Drake MD  Direct patient critical care time: 20 minutes  Ordering / reviewing critical care time: 5 minutes  Documentation critical care time: 5 minutes  Total critical care time (exclusive of procedural time) : 30 minutes        Labs Reviewed   CBC W/ AUTO DIFFERENTIAL - Abnormal; Notable for the following components:       Result Value    WBC 12.96 (*)     Gran # (ANC) 8.2 (*)     Mono # 1.1 (*)     All other components within normal limits   COMPREHENSIVE METABOLIC PANEL - Abnormal; Notable for  the following components:    Potassium 3.4 (*)     All other components within normal limits   LIPID PANEL - Abnormal; Notable for the following components:    Cholesterol 231 (*)     Triglycerides 270 (*)     HDL 38 (*)     HDL/Cholesterol Ratio 16.5 (*)     Total Cholesterol/HDL Ratio 6.1 (*)     All other components within normal limits   PROTIME-INR   TSH   POCT GLUCOSE   ISTAT CREATININE   POCT GLUCOSE MONITORING CONTINUOUS   POCT CREATININE        ECG Results              ECG 12 lead (In process)  Result time 08/13/23 05:06:32      In process by Interface, Lab In [facility] (08/13/23 05:06:32)                   Narrative:    Test Reason : I63.9,    Vent. Rate : 084 BPM     Atrial Rate : 084 BPM     P-R Int : 172 ms          QRS Dur : 088 ms      QT Int : 366 ms       P-R-T Axes : 064 071 051 degrees     QTc Int : 432 ms    Normal sinus rhythm  Possible Left atrial enlargement  Borderline Abnormal ECG  When compared with ECG of 06-AUG-2023 15:42,  No significant change was found    Referred By: AAAREFERR   SELF           Confirmed By:                                   Imaging Results              MRI Brain Without Contrast (Final result)  Result time 08/13/23 07:43:10      Final result by Bryan Lou MD (08/13/23 07:43:10)                   Narrative:    MR BRAIN WITHOUT IV CONTRAST    CLINICAL HISTORY:  23 years Male Transient ischemic attack (TIA)    COMPARISON: Brain MRI August 6, 2023    FINDINGS: Diffusion-weighted imaging is negative for acute ischemia or focal lesion. Gradient-echo imaging shows no evidence of intracranial hemorrhage.    No intracranial mass, midline shift, or mass effect. Ventricles and sulci are normal in size. No significant white matter signal abnormality. Low-lying left cerebellar tonsil best appreciated on sagittal T1 imaging. Right cerebellar hemisphere and brainstem are unremarkable. Major intracranial T2 flow-voids are patent.    Mastoid air cells are clear. Mucous retention  cysts or polyps within the maxillary sinuses. Orbits are unremarkable.    No bone marrow signal abnormality. Pituitary gland is within normal limits.    IMPRESSION:    Stable MRI of the brain. No evidence of acute ischemia or acute intracranial pathology.    Electronically signed by:  Bryan Lou MD  8/13/2023 7:43 AM CDT Workstation: [id number]                                     CTA Head and Neck (xpd) (Final result)  Result time 08/13/23 04:28:45      Final result by Elliott Good MD (08/13/23 04:28:45)                   Narrative:    EXAM DESCRIPTION:  CTA HEAD AND NECK (XPD)    CLINICAL HISTORY:  Stroke/TIA, determine embolic source    COMPARISON:  CTA head neck angiogram August 6, 2023.    TECHNIQUE:    Multiple helical axial tomographic images were obtained of the head and neck following administration of intravenous contrast per angiographic protocol. MIP reformatted images were obtained. This exam was performed according to our departmental dose-optimization program, which includes automated exposure control, adjustment of the mA and/or kV according to patient size and/or use of iterative reconstruction technique.    FINDINGS:    Head:    Exam limited due to suboptimal arterial opacification. There is faint visualization of the central intracranial arterial vasculature. Peripheral arterial branches are not well visualized.    There is no acute intracranial hemorrhage. No mass. No midline shift. No ventriculomegaly. Gray-white matter differentiation is maintained.    Maxillary sinus mucus retention cysts noted. Mastoid air cells and middle ear spaces are clear. Orbits and orbital contents are unremarkable.    Osseous structures are unremarkable. Surrounding soft tissues are unremarkable.    Neck:    Exam limited due to suboptimal arterial opacification.    Carotid and vertebral arterial vasculature of the neck appears patent without significant stenosis or occlusion.    Thyroid gland appears  unremarkable. Salivary glands appear unremarkable. No evidence of adenopathy. Retropharyngeal space appears normal. Epiglottis appears normal. Larynx and vocal folds appear unremarkable.    Visualized lungs are clear.    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    IMPRESSION:    1. Limited evaluation of the intracranial arterial vasculature due to suboptimal enhancement. Underlying significant arterial stenosis or occlusion would be difficult to exclude.  2. No evidence of significant arterial stenosis or occlusion within the neck.    Electronically signed by:  Elliott Good MD  8/13/2023 4:28 AM CDT Workstation: [id number]                                     CT HEAD FOR STROKE (Edited Result - FINAL)  Result time 08/13/23 03:53:53      Edited Result - FINAL by Kenyatta Meyers DO (08/13/23 03:53:53)                   Narrative:         ADDENDUM #1       Findings were discussed via telephone conference with Dr. JAH HERNANDEZ  on 8/13/2023 3:48 AM CDT  Findings were acknowledged and understood.    Electronically signed by:  Kenyatta Meyers DO  8/13/2023 3:53 AM CDT Workstation: [id number]     ORIGINAL REPORT       EXAM:  CT Head Without Intravenous Contrast    FACILITY:  [facility]    REFERRING:  AAAREFERRAL SELF    CLINICAL HISTORY:  23 years, Male; Neuro deficit, acute, stroke suspected    TECHNIQUE:  Axial computed tomography images of the head/brain without intravenous contrast.  Sagittal and coronal reformatted images were created and reviewed.  This CT exam was performed using one or more of the following dose reduction techniques:  automated exposure control, adjustment of the mA and/or kV according to patient size, and/or use of iterative reconstruction technique.    COMPARISON:  August 6, 2023    FINDINGS:  Brain:  No acute pathology.  No hemorrhage.  No significant white matter disease.  No  edema.  Ventricles:  No acute pathology.  No ventriculomegaly.  Bones/joints:  No acute pathology.  No acute fracture.  Soft tissues:  No acute pathology.  Sinuses:  Mucous retention cysts/polyps in the bilateral maxillary sinuses that were seen on the prior study.  Mastoid air cells:  Unremarkable as visualized.  No mastoid effusion.  Orbits:  Unremarkable as visualized.    IMPRESSION:  1.  No acute intracranial pathology. If symptoms persist consider repeat MRI for further evaluation.  2.  Low-lying left cerebellar tonsil unchanged from prior study.    Electronically signed by:  Kenyatta Meyers DO  8/13/2023 3:41 AM CDT Workstation: [id number]                      Final result by Kenyatta Meyers DO (08/13/23 03:41:10)                   Narrative:    EXAM:  CT Head Without Intravenous Contrast    FACILITY:  [facility]    REFERRING:  AAAREFERRAL SELF    CLINICAL HISTORY:  23 years, Male; Neuro deficit, acute, stroke suspected    TECHNIQUE:  Axial computed tomography images of the head/brain without intravenous contrast.  Sagittal and coronal reformatted images were created and reviewed.  This CT exam was performed using one or more of the following dose reduction techniques:  automated exposure control, adjustment of the mA and/or kV according to patient size, and/or use of iterative reconstruction technique.    COMPARISON:  August 6, 2023    FINDINGS:  Brain:  No acute pathology.  No hemorrhage.  No significant white matter disease.  No edema.  Ventricles:  No acute pathology.  No ventriculomegaly.  Bones/joints:  No acute pathology.  No acute fracture.  Soft tissues:  No acute pathology.  Sinuses:  Mucous retention cysts/polyps in the bilateral maxillary sinuses that were seen on the prior study.  Mastoid air cells:  Unremarkable as visualized.  No mastoid effusion.  Orbits:  Unremarkable as visualized.    IMPRESSION:  1.  No acute intracranial pathology. If symptoms persist consider repeat  MRI for further evaluation.  2.  Low-lying left cerebellar tonsil unchanged from prior study.    Electronically signed by:  Kenyatta Meyers DO  8/13/2023 3:41 AM CDT Workstation: [id number]                                     Medications   potassium chloride CR capsule 50 mEq (has no administration in time range)   iohexoL (OMNIPAQUE 350) injection 100 mL (100 mLs Intravenous Given 8/13/23 0321)   aspirin tablet 325 mg (325 mg Oral Given 8/13/23 0658)     Medical Decision Making:   Initial Assessment:   Seth Blair is a 23 y.o. male with past medical history of hypertension, chronic migraines, hyperlipidemia, and recent hospitalization for left-sided weakness and numbness the presented emergency department for evaluation of left-sided weakness and numbness x1 hour prior to arrival.  Vital stable.  Exam with dense weakness on the left.  Numbness in the left side including left face.  Difficulty moving bilateral eyebrows.  Differential Diagnosis:   CVA/TIA, seizure, electrolyte abnormality, hypoglycemia, ACS, thyroid abnormality, a complex migraine  Independently Interpreted Test(s):   I have ordered and independently interpreted EKG Reading(s) - see summary below       <> Summary of EKG Reading(s): Normal sinus rhythm without acute ST segment or T-wave changes.  Clinical Tests:   Lab Tests: Ordered and Reviewed  The following lab test(s) were unremarkable: CBC and CMP       <> Summary of Lab: TSH within normal limits.  Lipid panel shows hyper lipidemia.  Radiological Study: Ordered and Reviewed  Medical Tests: Ordered and Reviewed  ED Management:  Patient stroke activated upon arrival given 1 hour history of left arm and left leg weakness.  CT head is negative.  CTA was suboptimal and unable to be interpretable.  Discussed case with Neurology who requested that he get an MRI.  MRI is pending impression Per chart review, patient had similar symptoms 1 week ago which lasted approximately 10 minutes and then  resolved.  He was admitted and had a CT of the head, CTA, and MRI which were all negative for acute stroke.  He did not receive an echo.  Disposition pending MRI.  On reassessment, patient's strength is improving and is now able to raise his left arm above his head.  Discussed case further with the neurology (Dr. Pathak) who stated that if is negative and patient's symptoms are continuing to improve, patient is cleared to leave from their perspective.    Amador Grant  [facility] Emergency Medicine PGY4  08/13/2023 6:07 AM    Patient presented emergency department with left-sided paresthesias and tingling and numbness and weakness.  Patient had miserable deficits earlier so stroke activation and case discussed with neurologist.  Patient had recent symptoms similar to this about a week ago which improved but not completely resolved.  Patient said he was having headaches on and off over the past week..  Neurology was consulted and this was believed to be complex migraine.  MRI brain did not show any acute changes.  Patient's symptoms are consistent with a complex migraine and symptoms almost completely resolved on my exam.  Patient has numbness resolved but barely present.  Strength came back to normal.  Patient able to walk to the bathroom without difficulty.  Patient said he is almost back to baseline.  Discharged with return precautions and instructions and follow-up.  Patient advised to follow-up with neurologist as outpatient .  Patient seen and evaluated and examined along with the resident house officer.  Agree with plan and management and evaluation .  Present during all critical portions of procedures including EKG interpretation when EKG and critical procedures performed on patient               ED Course as of 08/13/23 0842   Sun Aug 13, 2023   0311 23-year-old male presents for 1 hour history of left arm and left leg weakness.  Vitals notable for mild tachycardia, mild elevation diastolic blood pressure.  Initially,  patient was not stroke activated due to patient reported history of stroke one-week ago.  Chart review shows that patient had CT head, CTA and MRI brain without evidence of stroke.  Given current deficits, will stroke activate [BS]   0508 Case discussed with Dr. Pathak.  If MRI shows no evidence of stroke, if patient's symptoms continue to improve, he is stable for discharge with outpatient follow-up [BS]      ED Course User Index  [BS] Beto Stevens MD                 Clinical Impression:   Final diagnoses:  [G43.809] Other migraine without status migrainosus, not intractable (Primary)  [R20.0] Left sided numbness        ED Disposition Condition    Discharge Stable          ED Prescriptions       Medication Sig Dispense Start Date End Date Auth. Provider    butalbital-acetaminophen-caffeine -40 mg (FIORICET, ESGIC) -40 mg per tablet Take 1 tablet by mouth every 4 (four) hours as needed for Pain or Headaches. 15 tablet 8/13/2023 9/12/2023 Manohar Drake MD          Follow-up Information       Follow up With Specialties Details Why Contact Info    [facility]  In 1 day  [address] [place] [address]  [phone]      Jamal Obrien MD Vascular Neurology, Neurology In 2 days  [address]  [place] [address]  [phone]               Manohar Drake MD  08/13/23 0302